# Patient Record
Sex: FEMALE | Race: WHITE | NOT HISPANIC OR LATINO | Employment: FULL TIME | URBAN - METROPOLITAN AREA
[De-identification: names, ages, dates, MRNs, and addresses within clinical notes are randomized per-mention and may not be internally consistent; named-entity substitution may affect disease eponyms.]

---
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
## 2018-02-08 ENCOUNTER — HOSPITAL ENCOUNTER (OUTPATIENT)
Dept: LAB | Facility: MEDICAL CENTER | Age: 18
End: 2018-02-08
Attending: INTERNAL MEDICINE
Payer: COMMERCIAL

## 2018-02-08 ENCOUNTER — OFFICE VISIT (OUTPATIENT)
Dept: MEDICAL GROUP | Facility: PHYSICIAN GROUP | Age: 18
End: 2018-02-08
Payer: COMMERCIAL

## 2018-02-08 VITALS
BODY MASS INDEX: 40.01 KG/M2 | RESPIRATION RATE: 14 BRPM | OXYGEN SATURATION: 98 % | SYSTOLIC BLOOD PRESSURE: 102 MMHG | WEIGHT: 264 LBS | HEIGHT: 68 IN | TEMPERATURE: 98.6 F | DIASTOLIC BLOOD PRESSURE: 64 MMHG | HEART RATE: 89 BPM

## 2018-02-08 DIAGNOSIS — G43.009 MIGRAINE WITHOUT AURA AND WITHOUT STATUS MIGRAINOSUS, NOT INTRACTABLE: ICD-10-CM

## 2018-02-08 DIAGNOSIS — E66.01 SEVERE OBESITY (BMI >= 40) (HCC): ICD-10-CM

## 2018-02-08 DIAGNOSIS — E28.2 PCOS (POLYCYSTIC OVARIAN SYNDROME): ICD-10-CM

## 2018-02-08 DIAGNOSIS — F43.23 ADJUSTMENT REACTION WITH ANXIETY AND DEPRESSION: ICD-10-CM

## 2018-02-08 DIAGNOSIS — Z82.49 FAMILY HISTORY OF EARLY CAD: ICD-10-CM

## 2018-02-08 DIAGNOSIS — J32.9 FREQUENT EPISODES OF SINUSITIS: ICD-10-CM

## 2018-02-08 DIAGNOSIS — J30.1 SEASONAL ALLERGIC RHINITIS DUE TO POLLEN, UNSPECIFIED CHRONICITY: ICD-10-CM

## 2018-02-08 DIAGNOSIS — J98.01 COLD INDUCED BRONCHOSPASM: ICD-10-CM

## 2018-02-08 LAB
25(OH)D3 SERPL-MCNC: 17 NG/ML (ref 30–100)
ALBUMIN SERPL BCP-MCNC: 4.5 G/DL (ref 3.2–4.9)
ALBUMIN/GLOB SERPL: 1.3 G/DL
ALP SERPL-CCNC: 88 U/L (ref 30–99)
ALT SERPL-CCNC: 16 U/L (ref 2–50)
ANION GAP SERPL CALC-SCNC: 8 MMOL/L (ref 0–11.9)
AST SERPL-CCNC: 16 U/L (ref 12–45)
BASOPHILS # BLD AUTO: 0.5 % (ref 0–1.8)
BASOPHILS # BLD: 0.04 K/UL (ref 0–0.12)
BILIRUB SERPL-MCNC: 0.5 MG/DL (ref 0.1–1.5)
BUN SERPL-MCNC: 11 MG/DL (ref 8–22)
CALCIUM SERPL-MCNC: 10.3 MG/DL (ref 8.5–10.5)
CHLORIDE SERPL-SCNC: 103 MMOL/L (ref 96–112)
CHOLEST SERPL-MCNC: 204 MG/DL (ref 100–199)
CO2 SERPL-SCNC: 26 MMOL/L (ref 20–33)
CREAT SERPL-MCNC: 0.71 MG/DL (ref 0.5–1.4)
EOSINOPHIL # BLD AUTO: 0.13 K/UL (ref 0–0.51)
EOSINOPHIL NFR BLD: 1.6 % (ref 0–6.9)
ERYTHROCYTE [DISTWIDTH] IN BLOOD BY AUTOMATED COUNT: 41.8 FL (ref 35.9–50)
EST. AVERAGE GLUCOSE BLD GHB EST-MCNC: 103 MG/DL
GLOBULIN SER CALC-MCNC: 3.5 G/DL (ref 1.9–3.5)
GLUCOSE SERPL-MCNC: 91 MG/DL (ref 65–99)
HBA1C MFR BLD: 5.2 % (ref 0–5.6)
HCT VFR BLD AUTO: 47.9 % (ref 37–47)
HDLC SERPL-MCNC: 48 MG/DL
HGB BLD-MCNC: 15.7 G/DL (ref 12–16)
IMM GRANULOCYTES # BLD AUTO: 0.02 K/UL (ref 0–0.11)
IMM GRANULOCYTES NFR BLD AUTO: 0.2 % (ref 0–0.9)
LDLC SERPL CALC-MCNC: 132 MG/DL
LYMPHOCYTES # BLD AUTO: 2.38 K/UL (ref 1–4.8)
LYMPHOCYTES NFR BLD: 28.5 % (ref 22–41)
MCH RBC QN AUTO: 28.3 PG (ref 27–33)
MCHC RBC AUTO-ENTMCNC: 32.8 G/DL (ref 33.6–35)
MCV RBC AUTO: 86.5 FL (ref 81.4–97.8)
MONOCYTES # BLD AUTO: 0.7 K/UL (ref 0–0.85)
MONOCYTES NFR BLD AUTO: 8.4 % (ref 0–13.4)
NEUTROPHILS # BLD AUTO: 5.09 K/UL (ref 2–7.15)
NEUTROPHILS NFR BLD: 60.8 % (ref 44–72)
NRBC # BLD AUTO: 0 K/UL
NRBC BLD-RTO: 0 /100 WBC
PLATELET # BLD AUTO: 314 K/UL (ref 164–446)
PMV BLD AUTO: 9.6 FL (ref 9–12.9)
POTASSIUM SERPL-SCNC: 4.1 MMOL/L (ref 3.6–5.5)
PROT SERPL-MCNC: 8 G/DL (ref 6–8.2)
RBC # BLD AUTO: 5.54 M/UL (ref 4.2–5.4)
SODIUM SERPL-SCNC: 137 MMOL/L (ref 135–145)
TRIGL SERPL-MCNC: 122 MG/DL (ref 0–149)
TSH SERPL DL<=0.005 MIU/L-ACNC: 3.86 UIU/ML (ref 0.38–5.33)
WBC # BLD AUTO: 8.4 K/UL (ref 4.8–10.8)

## 2018-02-08 PROCEDURE — 82306 VITAMIN D 25 HYDROXY: CPT

## 2018-02-08 PROCEDURE — 85025 COMPLETE CBC W/AUTO DIFF WBC: CPT

## 2018-02-08 PROCEDURE — 80053 COMPREHEN METABOLIC PANEL: CPT

## 2018-02-08 PROCEDURE — 83036 HEMOGLOBIN GLYCOSYLATED A1C: CPT

## 2018-02-08 PROCEDURE — 84443 ASSAY THYROID STIM HORMONE: CPT

## 2018-02-08 PROCEDURE — 99214 OFFICE O/P EST MOD 30 MIN: CPT | Performed by: INTERNAL MEDICINE

## 2018-02-08 PROCEDURE — 80061 LIPID PANEL: CPT

## 2018-02-08 PROCEDURE — 36415 COLL VENOUS BLD VENIPUNCTURE: CPT

## 2018-02-08 ASSESSMENT — PATIENT HEALTH QUESTIONNAIRE - PHQ9: CLINICAL INTERPRETATION OF PHQ2 SCORE: 0

## 2018-02-08 NOTE — ASSESSMENT & PLAN NOTE
She reports more anxiety than depression. She denies suicidal thoughts. She is currently looking for Job. No plan for college at this time. She likes to read books.

## 2018-02-08 NOTE — ASSESSMENT & PLAN NOTE
Due to PCOS, and also sometimes she is emotional eater. All her family is the same. Portion is another problem. She is not very active. She does not feel refresh in the morning. Mallampati score 3. She snores at night. She is not on meds for PCOS.

## 2018-02-08 NOTE — ASSESSMENT & PLAN NOTE
Very rare she uses albuterol prn, whenever she is sick. She denies shortness of breath, wheezing, cough.

## 2018-02-08 NOTE — ASSESSMENT & PLAN NOTE
She follows with GYN. She has sometimes cysts. Painful menstrual cycle. She was place on OCP but make the pain worse, so she stopped them. She has some facial hair, but not extensive. She does not want to take meds

## 2018-02-08 NOTE — PROGRESS NOTES
Subjective:   Jade Saunders is a 17 y.o. female here today for PCOS, weight loss, establish     Severe obesity (BMI >= 40) (CMS-HCC)  Due to PCOS, and also sometimes she is emotional eater. All her family is the same. Portion is another problem. She is not very active. She does not feel refresh in the morning. Mallampati score 3. She snores at night. She is not on meds for PCOS.     Seasonal allergic rhinitis due to pollen  Allergic to pines. She uses claritine prn, during spring season. No current flares     PCOS (polycystic ovarian syndrome)  She follows with GYN. She has sometimes cysts. Painful menstrual cycle. She was place on OCP but make the pain worse, so she stopped them. She has some facial hair, but not extensive. She does not want to take meds     Migraine without aura and without status migrainosus, not intractable  She gets migraine headaches rare, 1-2 times per month. She uses Excedrin and that relieves the headaches     Cold induced bronchospasm  Very rare she uses albuterol prn, whenever she is sick. She denies shortness of breath, wheezing, cough.    Adjustment reaction with anxiety and depression  She reports more anxiety than depression. She denies suicidal thoughts. She is currently looking for Job. No plan for college at this time. She likes to read books.        Current medicines (including changes today)  Current Outpatient Prescriptions   Medication Sig Dispense Refill   • albuterol 108 (90 BASE) MCG/ACT Aero Soln inhalation aerosol Inhale 2 Puffs by mouth every 6 hours as needed for Shortness of Breath. 8.5 g 1     No current facility-administered medications for this visit.      She  has a past medical history of Allergy; Anxiety; Depression; Migraine; and Polycystic ovarian syndrome.    Current Outpatient Prescriptions   Medication Sig Dispense Refill   • albuterol 108 (90 BASE) MCG/ACT Aero Soln inhalation aerosol Inhale 2 Puffs by mouth every 6 hours as needed for Shortness of  "Breath. 8.5 g 1     No current facility-administered medications for this visit.        Allergies as of 02/08/2018   • (No Known Allergies)       Social History     Social History   • Marital status: Single     Spouse name: N/A   • Number of children: N/A   • Years of education: N/A     Occupational History   • Not on file.     Social History Main Topics   • Smoking status: Never Smoker   • Smokeless tobacco: Never Used   • Alcohol use No   • Drug use: No   • Sexual activity: No     Other Topics Concern   • Behavioral Problems No   • Interpersonal Relationships No   • Sad Or Not Enjoying Activities No   • Suicidal Thoughts No   • Poor School Performance No   • Reading Difficulties No   • Speech Difficulties No   • Writing Difficulties No   • Inadequate Sleep Yes   • Excessive Tv Viewing No   • Excessive Video Game Use No   • Inadequate Exercise Yes   • Sports Related No   • Poor Diet No   • Family Concerns For Drug/Alcohol Abuse No   • Poor Oral Hygiene No   • Bike Safety No   • Family Concerns Vehicle Safety No     Social History Narrative   • No narrative on file        Family History   Problem Relation Age of Onset   • Heart Disease Father    • Hyperlipidemia Father    • Cancer Maternal Grandfather      skin cancer, possible melanoma       History reviewed. No pertinent surgical history.    ROS   No chest pain, no shortness of breath, no abdominal pain       Objective:     Blood pressure 102/64, pulse 89, temperature 37 °C (98.6 °F), resp. rate 14, height 1.727 m (5' 7.99\"), weight 119.7 kg (264 lb), last menstrual period 11/01/2017, SpO2 98 %, not currently breastfeeding. Body mass index is 40.15 kg/m².   Physical Exam:  Constitutional: Alert, no distress.  Skin: Warm, dry, good turgor, no rashes in visible areas. Significant acne   Eye: Equal, round and reactive, conjunctiva clear, lids normal.  ENMT: Lips without lesions, good dentition, oropharynx clear.  Neck: Trachea midline, no masses, no thyromegaly. No " cervical or supraclavicular lymphadenopathy  Respiratory: Unlabored respiratory effort, lungs clear to auscultation, no wheezes, no ronchi.  Cardiovascular: Normal S1, S2, no murmur, no edema.  Abdomen: Soft, non-tender, no masses, no hepatosplenomegaly.  Psych: Alert and oriented x3, normal affect and mood.        Assessment and Plan:   The following treatment plan was discussed    1. Severe obesity (BMI >= 40) (CMS-[facility])  Counseling for a small portion, balanced diet, exercising 3-5 times per week.  Rule out hypothyroidism, diabetes.   She is interested for weight loss program and nutrition counseling. Referral in place.  - HEMOGLOBIN A1C; Future  - COMP METABOLIC PANEL; Future  - CBC WITH DIFFERENTIAL; Future  - TSH WITH REFLEX TO FT4; Future  - LIPID PROFILE; Future  - VITAMIN D,25 HYDROXY; Future  - REFERRAL TO [facility] [facility]PROV[facility]GR[facility] (HIP) Services Requested: [facility] Weight Management Program; Reason for Referral? BMI>30; Reason for Visit: Overweight/Obesity    2. Seasonal allergic rhinitis due to pollen, unspecified chronicity  Saline nasal spray rinsing, flonase prn     3. Migraine without aura and without status migrainosus, not intractable  exedrine prn     4. Frequent episodes of sinusitis  Resolved., last one one year ago     5. Adjustment reaction with anxiety and depression  No need for medication. I advised pt for cousneling, cognitive behavioral therapy     6. Cold induced bronchospasm  Stable, no flares. Albuterol prn     7. Family history of early CAD  Weight loss. Lab work to follow     8. PCOS (polycystic ovarian syndrome)  Follow up with GYN   - HEMOGLOBIN A1C; Future  - COMP METABOLIC PANEL; Future      Followup: Return in about 1 year (around 2/8/2019), or if symptoms worsen or fail to improve, for Short, annual exam.

## 2018-02-08 NOTE — ASSESSMENT & PLAN NOTE
She gets migraine headaches rare, 1-2 times per month. She uses Excedrin and that relieves the headaches

## 2018-02-12 ENCOUNTER — TELEPHONE (OUTPATIENT)
Dept: MEDICAL GROUP | Facility: PHYSICIAN GROUP | Age: 18
End: 2018-02-12

## 2018-02-12 NOTE — TELEPHONE ENCOUNTER
----- Message from Cornelius Isidro M.D. sent at 2/11/2018 10:34 PM PST -----  Please notify patient that blood sugar, kidney function, liver function, blood count  are normal  Vitamin D is low. Please advise her to start taking vitamin D over the encounter 1000 U daily.   Also cholesterol is slight elevated, Please advise her low fat diet, fruits and vegetables, exercise 3-5 times per week.   Let me know if she has questions   Thank you    Cornelius Isidro M.D.

## 2018-02-12 NOTE — TELEPHONE ENCOUNTER
Phone Number Called: [phone] (home)     Message: LVM to call back.     Left Message for patient to call back: yes

## 2018-04-11 ENCOUNTER — HOSPITAL ENCOUNTER (OUTPATIENT)
Facility: MEDICAL CENTER | Age: 18
End: 2018-04-11
Attending: INTERNAL MEDICINE
Payer: COMMERCIAL

## 2018-04-11 ENCOUNTER — OFFICE VISIT (OUTPATIENT)
Dept: MEDICAL GROUP | Facility: PHYSICIAN GROUP | Age: 18
End: 2018-04-11
Payer: COMMERCIAL

## 2018-04-11 VITALS
HEART RATE: 90 BPM | TEMPERATURE: 98.6 F | SYSTOLIC BLOOD PRESSURE: 116 MMHG | OXYGEN SATURATION: 100 % | HEIGHT: 68 IN | WEIGHT: 272 LBS | BODY MASS INDEX: 41.22 KG/M2 | RESPIRATION RATE: 14 BRPM | DIASTOLIC BLOOD PRESSURE: 70 MMHG

## 2018-04-11 DIAGNOSIS — B37.31 YEAST VAGINITIS: ICD-10-CM

## 2018-04-11 DIAGNOSIS — L73.9 FOLLICULITIS: ICD-10-CM

## 2018-04-11 PROCEDURE — 87510 GARDNER VAG DNA DIR PROBE: CPT

## 2018-04-11 PROCEDURE — 99000 SPECIMEN HANDLING OFFICE-LAB: CPT | Performed by: INTERNAL MEDICINE

## 2018-04-11 PROCEDURE — 87660 TRICHOMONAS VAGIN DIR PROBE: CPT

## 2018-04-11 PROCEDURE — 87480 CANDIDA DNA DIR PROBE: CPT

## 2018-04-11 PROCEDURE — 99214 OFFICE O/P EST MOD 30 MIN: CPT | Performed by: INTERNAL MEDICINE

## 2018-04-11 RX ORDER — FLUCONAZOLE 150 MG/1
150 TABLET ORAL
Qty: 2 TAB | Refills: 0 | Status: SHIPPED | OUTPATIENT
Start: 2018-04-11 | End: 2019-04-24

## 2018-04-11 NOTE — PROGRESS NOTES
Subjective:   Jade Saunders is a 17 y.o. female here today for cyst around thig, vaginal discharges     18 Y/o F past medical history of obesity, migraine, adjustment anxiety presented complain for cyst on upper thigh, around genital are, sometimes irritated, worse after exercising. She is using warm compression and helps. No fever.   Also pt reports white vaginal discharge for couple of weeks, some itchiness, never been sexually active. No fever. She denies dysuria.        Current medicines (including changes today)  Current Outpatient Prescriptions   Medication Sig Dispense Refill   • fluconazole (DIFLUCAN) 150 MG tablet Take 1 Tab by mouth every 72 hours. 2 Tab 0   • albuterol 108 (90 BASE) MCG/ACT Aero Soln inhalation aerosol Inhale 2 Puffs by mouth every 6 hours as needed for Shortness of Breath. 8.5 g 1     No current facility-administered medications for this visit.      She  has a past medical history of Allergy; Anxiety; Depression; Migraine; and Polycystic ovarian syndrome.    Current Outpatient Prescriptions   Medication Sig Dispense Refill   • fluconazole (DIFLUCAN) 150 MG tablet Take 1 Tab by mouth every 72 hours. 2 Tab 0   • albuterol 108 (90 BASE) MCG/ACT Aero Soln inhalation aerosol Inhale 2 Puffs by mouth every 6 hours as needed for Shortness of Breath. 8.5 g 1     No current facility-administered medications for this visit.        Allergies as of 04/11/2018   • (No Known Allergies)       Social History     Social History   • Marital status: Single     Spouse name: N/A   • Number of children: N/A   • Years of education: N/A     Occupational History   • Not on file.     Social History Main Topics   • Smoking status: Never Smoker   • Smokeless tobacco: Never Used   • Alcohol use No   • Drug use: No   • Sexual activity: No     Other Topics Concern   • Behavioral Problems No   • Interpersonal Relationships No   • Sad Or Not Enjoying Activities No   • Suicidal Thoughts No   • Poor School Performance  "No   • Reading Difficulties No   • Speech Difficulties No   • Writing Difficulties No   • Inadequate Sleep Yes   • Excessive Tv Viewing No   • Excessive Video Game Use No   • Inadequate Exercise Yes   • Sports Related No   • Poor Diet No   • Family Concerns For Drug/Alcohol Abuse No   • Poor Oral Hygiene No   • Bike Safety No   • Family Concerns Vehicle Safety No     Social History Narrative   • No narrative on file        Family History   Problem Relation Age of Onset   • Heart Disease Father    • Hyperlipidemia Father    • Cancer Maternal Grandfather      skin cancer, possible melanoma       No past surgical history on file.    ROS   No chest pain, no shortness of breath, no abdominal pain, see HPI        Objective:     Pulse 90, temperature 37 °C (98.6 °F), height 1.727 m (5' 7.99\"), weight 123.4 kg (272 lb), last menstrual period 03/01/2018, SpO2 100 %, not currently breastfeeding. Body mass index is 41.37 kg/m².   Physical Exam:  Constitutional: Alert, no distress.  Skin: Warm, dry, she has acne, folliculitis on upper thigh. No carbuncle, no ingrowing hair   Eye: Equal, round and reactive, conjunctiva clear, lids normal.  ENMT: Lips without lesions, good dentition,  Respiratory: Unlabored respiratory effort,  Abdomen: Soft, non-tender, no masses, no hepatosplenomegaly.  Psych: Alert and oriented x3, normal affect and mood.  : skin as per above. No genital rash. Pt is very stressed. Cannot allow speculum visualization, I tried vaginal pathogen swab. No lower pelvic tenderness.       Assessment and Plan:   The following treatment plan was discussed    1. Yeast vaginitis  I will treat empiric for yeast vaginitis. I cannot rule out bacterial vaginosis. She has never been sexually active. No risk for STD. No external lesions. Culture to follow     2. Folliculitis  I advise to continue well hygiene. I advise to use neosporin cream after shower, and baby powder when she goes for exercise       Followup: Return if " symptoms worsen or fail to improve, for Short.

## 2018-04-12 DIAGNOSIS — B37.31 YEAST VAGINITIS: ICD-10-CM

## 2018-04-12 LAB
CANDIDA DNA VAG QL PROBE+SIG AMP: NEGATIVE
G VAGINALIS DNA VAG QL PROBE+SIG AMP: NEGATIVE
T VAGINALIS DNA VAG QL PROBE+SIG AMP: NEGATIVE

## 2018-08-21 ENCOUNTER — OFFICE VISIT (OUTPATIENT)
Dept: URGENT CARE | Facility: CLINIC | Age: 18
End: 2018-08-21
Payer: COMMERCIAL

## 2018-08-21 ENCOUNTER — APPOINTMENT (OUTPATIENT)
Dept: RADIOLOGY | Facility: IMAGING CENTER | Age: 18
End: 2018-08-21
Attending: NURSE PRACTITIONER
Payer: COMMERCIAL

## 2018-08-21 VITALS
HEIGHT: 68 IN | BODY MASS INDEX: 43.1 KG/M2 | SYSTOLIC BLOOD PRESSURE: 100 MMHG | RESPIRATION RATE: 16 BRPM | TEMPERATURE: 98.2 F | OXYGEN SATURATION: 97 % | HEART RATE: 97 BPM | WEIGHT: 284.4 LBS | DIASTOLIC BLOOD PRESSURE: 80 MMHG

## 2018-08-21 DIAGNOSIS — R07.81 RIB PAIN ON RIGHT SIDE: ICD-10-CM

## 2018-08-21 DIAGNOSIS — E66.01 MORBID OBESITY WITH BMI OF 40.0-44.9, ADULT (HCC): ICD-10-CM

## 2018-08-21 DIAGNOSIS — S29.011A CHEST WALL MUSCLE STRAIN, INITIAL ENCOUNTER: ICD-10-CM

## 2018-08-21 PROCEDURE — 71101 X-RAY EXAM UNILAT RIBS/CHEST: CPT | Mod: TC,RT | Performed by: NURSE PRACTITIONER

## 2018-08-21 PROCEDURE — 99214 OFFICE O/P EST MOD 30 MIN: CPT | Performed by: NURSE PRACTITIONER

## 2018-08-21 RX ORDER — CYCLOBENZAPRINE HCL 10 MG
10 TABLET ORAL 3 TIMES DAILY PRN
Qty: 20 TAB | Refills: 0 | Status: SHIPPED | OUTPATIENT
Start: 2018-08-21 | End: 2019-04-24

## 2018-08-21 NOTE — PROGRESS NOTES
Subjective:      Jade Saunders is a 18 y.o. female who presents with Rib Pain (RT side started yesterday)            This is a new problem. Pt reports she was lying in bed yesterday when she sneezed very abruptly and forcefully. She reports pain to her right lower rib cage immediately after this happened. She states she cannot take a deep breath, change positions or cough without pain. She admits she took advil last night and used a warm compress and could barely get comfortable enough to fall asleep. When she woke this morning, the pain was still present as bad as it was yesterday. She can reproduce the pain with pushing on the area. She denies any SOB or trouble breathing.        Review of Systems   Musculoskeletal:        Chest wall pain   All other systems reviewed and are negative.    Past Medical History:   Diagnosis Date   • Allergy    • Anxiety     seeing a psychologist   • Depression     seeing psychologist   • Migraine     once a week - excedrin   • Polycystic ovarian syndrome     diagnosed 2012    No past surgical history on file.   Social History     Social History   • Marital status: Single     Spouse name: N/A   • Number of children: N/A   • Years of education: N/A     Occupational History   • Not on file.     Social History Main Topics   • Smoking status: Never Smoker   • Smokeless tobacco: Never Used   • Alcohol use No   • Drug use: No   • Sexual activity: No     Other Topics Concern   • Behavioral Problems No   • Interpersonal Relationships No   • Sad Or Not Enjoying Activities No   • Suicidal Thoughts No   • Poor School Performance No   • Reading Difficulties No   • Speech Difficulties No   • Writing Difficulties No   • Inadequate Sleep Yes   • Excessive Tv Viewing No   • Excessive Video Game Use No   • Inadequate Exercise Yes   • Sports Related No   • Poor Diet No   • Family Concerns For Drug/Alcohol Abuse No   • Poor Oral Hygiene No   • Bike Safety No   • Family Concerns Vehicle Safety No      "    Social History Narrative   • No narrative on file          Objective:     /80   Pulse 97   Temp 36.8 °C (98.2 °F)   Resp 16   Ht 1.727 m (5' 8\")   Wt (!) 129 kg (284 lb 6.4 oz)   SpO2 97%   BMI 43.24 kg/m²      Physical Exam   Constitutional: She is oriented to person, place, and time. Vital signs are normal. She appears well-developed and well-nourished.   HENT:   Head: Normocephalic and atraumatic.   Eyes: Pupils are equal, round, and reactive to light. EOM are normal.   Neck: Normal range of motion.   Cardiovascular: Normal rate and regular rhythm.    Pulmonary/Chest: Effort normal and breath sounds normal. She exhibits tenderness.       Musculoskeletal: Normal range of motion.   Neurological: She is alert and oriented to person, place, and time.   Skin: Skin is warm and dry. Capillary refill takes less than 2 seconds.   Psychiatric: She has a normal mood and affect. Her speech is normal and behavior is normal. Thought content normal.   Vitals reviewed.         Xray: no fracture or dislocation by my read. Radiology review pending.       8/21/2018 8:49 AM    HISTORY/REASON FOR EXAM:  Patient sneezed yesterday and felt a pop rib cage area under her right breast.    TECHNIQUE/EXAM DESCRIPTION AND NUMBER OF VIEWS:  5 images of the right ribs and chest.    COMPARISON: Chest x-ray 5/8/2017    FINDINGS:  Cardiomediastinal contour is within normal limits.  No focal pulmonary consolidation.  No pleural fluid collection or pneumothorax.  No displaced RIGHT rib fracture.  No major bony abnormality is seen.   Impression       1.  No acute cardiopulmonary disease.  2.  No acute RIGHT rib findings.       Assessment/Plan:       1. Rib pain on right side  - [id number] (WITH 1-VIEW CXR) RIGHT; Future    2. Chest wall muscle strain, initial encounter  - cyclobenzaprine (FLEXERIL) 10 MG Tab; Take 1 Tab by mouth 3 times a day as needed for Muscle Spasms (take at night for bed).  Dispense: 20 Tab; Refill: " 0    3. Morbid obesity with BMI of 40.0-44.9, adult (HCC)  - Patient identified as having weight management issue.  Appropriate orders and counseling given.    Discussed xray findings with patient and reassured her this appears to be an MSK strain. She may have sharp pain to the area for several weeks  Alternate tylenol and ibuprofen regularly for the next few days  Sedating effects of flexeril discussed, take at night  Warm compresses as often as needed to help with pain  Demonstrated splinting techniques  Supportive care, differential diagnoses, and indications for immediate follow-up discussed with patient.    Pathogenesis of diagnosis discussed including typical length and natural progression.      Instructed to return to  or nearest emergency department if symptoms fail to improve, for any change in condition, further concerns, or new concerning symptoms.  Patient states understanding of the plan of care and discharge instructions.

## 2018-08-22 ENCOUNTER — APPOINTMENT (OUTPATIENT)
Dept: MEDICAL GROUP | Facility: PHYSICIAN GROUP | Age: 18
End: 2018-08-22
Payer: COMMERCIAL

## 2018-12-30 ENCOUNTER — OFFICE VISIT (OUTPATIENT)
Dept: URGENT CARE | Facility: CLINIC | Age: 18
End: 2018-12-30
Payer: COMMERCIAL

## 2018-12-30 DIAGNOSIS — J32.9 OTHER SINUSITIS, UNSPECIFIED CHRONICITY: ICD-10-CM

## 2018-12-30 DIAGNOSIS — J40 BRONCHITIS: ICD-10-CM

## 2018-12-30 DIAGNOSIS — J45.20 RAD (REACTIVE AIRWAY DISEASE), MILD INTERMITTENT, UNCOMPLICATED: ICD-10-CM

## 2018-12-30 PROCEDURE — 99214 OFFICE O/P EST MOD 30 MIN: CPT | Performed by: PHYSICIAN ASSISTANT

## 2018-12-30 RX ORDER — AZITHROMYCIN 500 MG/1
500 TABLET, FILM COATED ORAL DAILY
Qty: 10 TAB | Refills: 0 | Status: SHIPPED | OUTPATIENT
Start: 2018-12-30 | End: 2019-01-09

## 2018-12-30 RX ORDER — PROMETHAZINE HYDROCHLORIDE AND CODEINE PHOSPHATE 6.25; 1 MG/5ML; MG/5ML
5-10 SYRUP ORAL 3 TIMES DAILY PRN
Qty: 150 ML | Refills: 0 | Status: SHIPPED | OUTPATIENT
Start: 2018-12-30 | End: 2019-01-04

## 2018-12-30 RX ORDER — ALBUTEROL SULFATE 90 UG/1
2 AEROSOL, METERED RESPIRATORY (INHALATION) EVERY 4 HOURS PRN
Qty: 1 INHALER | Refills: 0 | Status: SHIPPED | OUTPATIENT
Start: 2018-12-30 | End: 2019-04-24

## 2018-12-30 RX ORDER — METHYLPREDNISOLONE 4 MG/1
TABLET ORAL
Qty: 1 TAB | Refills: 0 | Status: SHIPPED | OUTPATIENT
Start: 2018-12-30 | End: 2019-04-24

## 2018-12-30 ASSESSMENT — ENCOUNTER SYMPTOMS
EYES NEGATIVE: 1
RHINORRHEA: 1
CONSTITUTIONAL NEGATIVE: 1
CARDIOVASCULAR NEGATIVE: 1
FEVER: 0
WHEEZING: 1
SORE THROAT: 1
COUGH: 1
SHORTNESS OF BREATH: 1

## 2018-12-30 NOTE — PROGRESS NOTES
Subjective:      Jade Saunders is a 18 y.o. female who presents with No chief complaint on file.            Cough   This is a new problem. The current episode started in the past 7 days. The problem has been unchanged. The problem occurs every few minutes. The cough is productive of sputum. Associated symptoms include nasal congestion, rhinorrhea, a sore throat, shortness of breath and wheezing. Pertinent negatives include no fever. Nothing aggravates the symptoms. She has tried nothing for the symptoms. The treatment provided no relief. There is no history of asthma.       Review of Systems   Constitutional: Negative.  Negative for fever.   HENT: Positive for rhinorrhea and sore throat.    Eyes: Negative.    Respiratory: Positive for cough, shortness of breath and wheezing.    Cardiovascular: Negative.    Skin: Negative.           Objective:     There were no vitals taken for this visit.     Physical Exam   Constitutional: She is oriented to person, place, and time. She appears well-developed and well-nourished. No distress.   HENT:   Head: Normocephalic and atraumatic.   Mouth/Throat: Oropharynx is clear and moist.   Eyes: Pupils are equal, round, and reactive to light. Conjunctivae and EOM are normal.   Neck: Normal range of motion. Neck supple.   Cardiovascular: Normal rate, regular rhythm and normal heart sounds.    Pulmonary/Chest: Effort normal. No respiratory distress. She has wheezes (?Whz/crackle L>R side). She has no rales.   Lymphadenopathy:     She has no cervical adenopathy.   Neurological: She is alert and oriented to person, place, and time.   Skin: Skin is warm and dry.   Psychiatric: She has a normal mood and affect. Her behavior is normal.   Nursing note and vitals reviewed.    Active Ambulatory Problems     Diagnosis Date Noted   • Migraine without aura and without status migrainosus, not intractable 02/08/2017   • Cold induced bronchospasm 02/08/2017   • Family history of early CAD  02/08/2017   • Adjustment reaction with anxiety and depression 02/08/2017   • Severe obesity (BMI >= 40) ([facility]) 02/08/2017   • Seasonal allergic rhinitis due to pollen 02/08/2017   • PCOS (polycystic ovarian syndrome) 02/08/2018   • Yeast vaginitis 04/11/2018   • Folliculitis 04/11/2018   • Morbid obesity with BMI of 40.0-44.9, adult ([facility]) 08/21/2018     Resolved Ambulatory Problems     Diagnosis Date Noted   • Acute pharyngitis 09/16/2016   • Acute lymphadenitis 09/16/2016   • Frequent episodes of sinusitis 02/08/2017     Past Medical History:   Diagnosis Date   • Allergy    • Anxiety    • Depression    • Migraine    • Polycystic ovarian syndrome      Current Outpatient Prescriptions on File Prior to Visit   Medication Sig Dispense Refill   • cyclobenzaprine (FLEXERIL) 10 MG Tab Take 1 Tab by mouth 3 times a day as needed for Muscle Spasms (take at night for bed). 20 Tab 0   • fluconazole (DIFLUCAN) 150 MG tablet Take 1 Tab by mouth every 72 hours. 2 Tab 0   • albuterol 108 (90 BASE) MCG/ACT Aero Soln inhalation aerosol Inhale 2 Puffs by mouth every 6 hours as needed for Shortness of Breath. 8.5 g 1     No current facility-administered medications on file prior to visit.      Social History     Social History   • Marital status: Single     Spouse name: N/A   • Number of children: N/A   • Years of education: N/A     Occupational History   • Not on file.     Social History Main Topics   • Smoking status: Never Smoker   • Smokeless tobacco: Never Used   • Alcohol use No   • Drug use: No   • Sexual activity: No     Other Topics Concern   • Behavioral Problems No   • Interpersonal Relationships No   • Sad Or Not Enjoying Activities No   • Suicidal Thoughts No   • Poor School Performance No   • Reading Difficulties No   • Speech Difficulties No   • Writing Difficulties No   • Inadequate Sleep Yes   • Excessive Tv Viewing No   • Excessive Video Game Use No   • Inadequate Exercise Yes   • Sports Related No   • Poor Diet No    • Family Concerns For Drug/Alcohol Abuse No   • Poor Oral Hygiene No   • Bike Safety No   • Family Concerns Vehicle Safety No     Social History Narrative   • No narrative on file     Family History   Problem Relation Age of Onset   • Heart Disease Father    • Hyperlipidemia Father    • Cancer Maternal Grandfather         skin cancer, possible melanoma     Patient has no known allergies.              Assessment/Plan:     · Acute  bronchitis      · rx abx [will start on stronger med init.dosing to cover for acute cough vs early pneumonia]

## 2019-04-24 ENCOUNTER — OFFICE VISIT (OUTPATIENT)
Dept: MEDICAL GROUP | Facility: PHYSICIAN GROUP | Age: 19
End: 2019-04-24
Payer: COMMERCIAL

## 2019-04-24 ENCOUNTER — HOSPITAL ENCOUNTER (OUTPATIENT)
Dept: LAB | Facility: MEDICAL CENTER | Age: 19
End: 2019-04-24
Attending: NURSE PRACTITIONER
Payer: COMMERCIAL

## 2019-04-24 VITALS
WEIGHT: 279 LBS | DIASTOLIC BLOOD PRESSURE: 84 MMHG | HEIGHT: 68 IN | SYSTOLIC BLOOD PRESSURE: 116 MMHG | RESPIRATION RATE: 16 BRPM | OXYGEN SATURATION: 97 % | HEART RATE: 89 BPM | TEMPERATURE: 97.9 F | BODY MASS INDEX: 42.28 KG/M2

## 2019-04-24 DIAGNOSIS — R06.83 SNORING: ICD-10-CM

## 2019-04-24 DIAGNOSIS — E66.01 SEVERE OBESITY (BMI >= 40) (HCC): ICD-10-CM

## 2019-04-24 DIAGNOSIS — H66.001 ACUTE SUPPURATIVE OTITIS MEDIA OF RIGHT EAR WITHOUT SPONTANEOUS RUPTURE OF TYMPANIC MEMBRANE, RECURRENCE NOT SPECIFIED: ICD-10-CM

## 2019-04-24 DIAGNOSIS — F43.23 ADJUSTMENT REACTION WITH ANXIETY AND DEPRESSION: ICD-10-CM

## 2019-04-24 DIAGNOSIS — H61.21 CERUMEN DEBRIS ON TYMPANIC MEMBRANE OF RIGHT EAR: ICD-10-CM

## 2019-04-24 DIAGNOSIS — G47.19 EXCESSIVE DAYTIME SLEEPINESS: ICD-10-CM

## 2019-04-24 DIAGNOSIS — F45.41 STRESS HEADACHES: ICD-10-CM

## 2019-04-24 DIAGNOSIS — J45.909 ASTHMA DUE TO ENVIRONMENTAL ALLERGIES: ICD-10-CM

## 2019-04-24 DIAGNOSIS — F43.21 GRIEF REACTION: ICD-10-CM

## 2019-04-24 DIAGNOSIS — G43.009 MIGRAINE WITHOUT AURA AND WITHOUT STATUS MIGRAINOSUS, NOT INTRACTABLE: ICD-10-CM

## 2019-04-24 DIAGNOSIS — E28.2 PCOS (POLYCYSTIC OVARIAN SYNDROME): ICD-10-CM

## 2019-04-24 DIAGNOSIS — Z00.00 GENERAL MEDICAL EXAM: ICD-10-CM

## 2019-04-24 DIAGNOSIS — F45.8 BRUXISM (TEETH GRINDING): ICD-10-CM

## 2019-04-24 PROBLEM — F43.20 GRIEF REACTION: Status: ACTIVE | Noted: 2019-04-24

## 2019-04-24 LAB
ALBUMIN SERPL BCP-MCNC: 4.4 G/DL (ref 3.2–4.9)
ALBUMIN/GLOB SERPL: 1.3 G/DL
ALP SERPL-CCNC: 96 U/L (ref 30–99)
ALT SERPL-CCNC: 41 U/L (ref 2–50)
ANION GAP SERPL CALC-SCNC: 8 MMOL/L (ref 0–11.9)
AST SERPL-CCNC: 32 U/L (ref 12–45)
BASOPHILS # BLD AUTO: 0.5 % (ref 0–1.8)
BASOPHILS # BLD: 0.05 K/UL (ref 0–0.12)
BILIRUB SERPL-MCNC: 0.6 MG/DL (ref 0.1–1.5)
BUN SERPL-MCNC: 14 MG/DL (ref 8–22)
CALCIUM SERPL-MCNC: 9.6 MG/DL (ref 8.5–10.5)
CHLORIDE SERPL-SCNC: 104 MMOL/L (ref 96–112)
CHOLEST SERPL-MCNC: 206 MG/DL (ref 100–199)
CO2 SERPL-SCNC: 24 MMOL/L (ref 20–33)
CREAT SERPL-MCNC: 0.63 MG/DL (ref 0.5–1.4)
EOSINOPHIL # BLD AUTO: 0.16 K/UL (ref 0–0.51)
EOSINOPHIL NFR BLD: 1.6 % (ref 0–6.9)
ERYTHROCYTE [DISTWIDTH] IN BLOOD BY AUTOMATED COUNT: 43.9 FL (ref 35.9–50)
FASTING STATUS PATIENT QL REPORTED: NORMAL
GLOBULIN SER CALC-MCNC: 3.3 G/DL (ref 1.9–3.5)
GLUCOSE SERPL-MCNC: 87 MG/DL (ref 65–99)
HCT VFR BLD AUTO: 46.9 % (ref 37–47)
HDLC SERPL-MCNC: 55 MG/DL
HGB BLD-MCNC: 15.2 G/DL (ref 12–16)
IMM GRANULOCYTES # BLD AUTO: 0.04 K/UL (ref 0–0.11)
IMM GRANULOCYTES NFR BLD AUTO: 0.4 % (ref 0–0.9)
LDLC SERPL CALC-MCNC: 130 MG/DL
LYMPHOCYTES # BLD AUTO: 2.64 K/UL (ref 1–4.8)
LYMPHOCYTES NFR BLD: 26.3 % (ref 22–41)
MCH RBC QN AUTO: 28.3 PG (ref 27–33)
MCHC RBC AUTO-ENTMCNC: 32.4 G/DL (ref 33.6–35)
MCV RBC AUTO: 87.3 FL (ref 81.4–97.8)
MONOCYTES # BLD AUTO: 0.83 K/UL (ref 0–0.85)
MONOCYTES NFR BLD AUTO: 8.3 % (ref 0–13.4)
NEUTROPHILS # BLD AUTO: 6.32 K/UL (ref 2–7.15)
NEUTROPHILS NFR BLD: 62.9 % (ref 44–72)
NRBC # BLD AUTO: 0 K/UL
NRBC BLD-RTO: 0 /100 WBC
PLATELET # BLD AUTO: 339 K/UL (ref 164–446)
PMV BLD AUTO: 9.5 FL (ref 9–12.9)
POTASSIUM SERPL-SCNC: 3.8 MMOL/L (ref 3.6–5.5)
PROT SERPL-MCNC: 7.7 G/DL (ref 6–8.2)
RBC # BLD AUTO: 5.37 M/UL (ref 4.2–5.4)
SODIUM SERPL-SCNC: 136 MMOL/L (ref 135–145)
TRIGL SERPL-MCNC: 107 MG/DL (ref 0–149)
WBC # BLD AUTO: 10 K/UL (ref 4.8–10.8)

## 2019-04-24 PROCEDURE — 80061 LIPID PANEL: CPT

## 2019-04-24 PROCEDURE — 36415 COLL VENOUS BLD VENIPUNCTURE: CPT

## 2019-04-24 PROCEDURE — 80053 COMPREHEN METABOLIC PANEL: CPT

## 2019-04-24 PROCEDURE — 85025 COMPLETE CBC W/AUTO DIFF WBC: CPT

## 2019-04-24 PROCEDURE — 99214 OFFICE O/P EST MOD 30 MIN: CPT | Performed by: NURSE PRACTITIONER

## 2019-04-24 RX ORDER — AMOXICILLIN 500 MG/1
500 CAPSULE ORAL 3 TIMES DAILY
Qty: 30 CAP | Refills: 0 | Status: SHIPPED | OUTPATIENT
Start: 2019-04-24 | End: 2022-04-24

## 2019-04-24 RX ORDER — CYCLOBENZAPRINE HCL 5 MG
5-10 TABLET ORAL 3 TIMES DAILY PRN
Qty: 30 TAB | Refills: 0 | Status: SHIPPED | OUTPATIENT
Start: 2019-04-24 | End: 2022-04-24

## 2019-04-24 ASSESSMENT — PATIENT HEALTH QUESTIONNAIRE - PHQ9: CLINICAL INTERPRETATION OF PHQ2 SCORE: 0

## 2019-04-24 NOTE — PROGRESS NOTES
Chief Complaint   Patient presents with   • Snoring     check for sleep apnea; not sleeping as well    • Sinus Problem     allergies    • Headache     back of neck x 1 month        HISTORY OF THE PRESENT ILLNESS: This is a 18 y.o. female established patient with the clinic, normally seen by Dr. Isidro who presents today for follow up.    Snoring  Excessive daytime sleepiness  Patient is inquiring about sleep studies. She reports a family history of sleep apnea in her father, and snores nightly herself. Patient reports she wakes up in the middle of the night suddenly for no particular reason and experienced day time sleepiness.    Cerumen debris on tympanic membrane of right ear  Acute suppurative otitis media of right ear without spontaneous rupture of tympanic membrane, recurrence not specified  Patient is also reporting persistent congestion(sinus and ear) believed to be secondary to her seasonal allergies. She is currently experiencing ear pain, with a recurrent history of otitis media due to the congestion. She does a specific regimen of 3 weeks of Zyrtec, then 3 weeks of Claritin, then 3 weeks of Allegra, which seems to manage her symptoms. She will also use Mucinex if she does not work that day. No complaints of fevers, chills, sore throat.    Asthma due to environmental allergies  Patient has a history of viral induced asthma, managed well with a rescue albuterol inhaler.    PCOS (polycystic ovarian syndrome)  In regards to her PCOS, patient has tried to manage this with birth control in the past, however, she developed worsening abdominal pain secondary to more frequent cysts, and worsening of her mood swings, so she is not on birth control currently. Patient reports that her periods are more regulated recently since she increased her exercise and began watching her diet more closely taking out sugar and salt.    Adjustment reaction with anxiety and depression  Patient has a history of anxiety and depression.  She used to follow with psychiatry for these conditions, however, since exiting high school and beginning to work, the depression and anxiety are better managed without medications.     Migraine without aura and without status migrainosus, not intractable  Stress headaches  Bruxism (teeth grinding)  Grief reaction  Patient also has a history of migraines without auras, normally triggered by stress or atmosphere changes. Normally these are located across her temples, managed with OTC Excedrin. Migraines started in highschool, and recently she has only been experiencing 3 migraines a month. Over the last few weeks patient has been experiencing a different type of headache, that now begins at the base of her neck and extends up into the back of her head. This is not managed well with Excedrin as her past headaches have been and Advil only improved the headache a small amount. Patient believes this could be secondary to stress, as her dog passed away just before these new headaches began, and she began to grind her teeth more frequently as well.  At this time, she reports she wakes up every morning with a stiff neck and this headache, that gradually resolves throughout the day.    Past Medical History:   Diagnosis Date   • Allergy    • Anxiety     seeing a psychologist   • Depression     seeing psychologist   • Migraine     once a week - Excedrin   • Polycystic ovarian syndrome     diagnosed 2012       Past Surgical History:   Procedure Laterality Date   • DENTAL EXTRACTION(S)         Family Status   Relation Status   • Fa Alive   • Mo Alive   • Sis Alive   • Bro Alive   • PGMo Alive   • PGFa Alive   • MGFa (Not Specified)     Family History   Problem Relation Age of Onset   • Heart Disease Father 43        MI   • Hyperlipidemia Father    • Cancer Paternal Grandmother         possible melanoma       Social History   Substance Use Topics   • Smoking status: Never Smoker   • Smokeless tobacco: Never Used   • Alcohol use  "No       Allergies: Patient has no known allergies.    Current Outpatient Prescriptions Ordered in [facility]   Medication Sig Dispense Refill   • cyclobenzaprine (FLEXERIL) 5 MG tablet Take 1-2 Tabs by mouth 3 times a day as needed for Muscle Spasms. 30 Tab 0   • albuterol 108 (90 BASE) MCG/ACT Aero Soln inhalation aerosol Inhale 2 Puffs by mouth every 6 hours as needed for Shortness of Breath. 8.5 g 1     No current [facility]-ordered facility-administered medications on file.        Review of Systems   Constitutional: day time sleepyness Negative for fever, chills, weight loss  HENT: positive snoring, teeth grinding, sinus and ear congestion, ear pain Negative for nosebleeds, sore throat and neck pain.    Eyes:  Negative for blurred vision.   Respiratory:  Negative for cough, sputum production, shortness of breath and wheezing.    Cardiovascular:  Negative for chest pain, palpitations, orthopnea and leg swelling.   Gastrointestinal:  Negative for heartburn, nausea, vomiting and abdominal pain.   Genitourinary:  Negative for dysuria, urgency and frequency.   Musculoskeletal: positive neck pain, Negative for back pain and joint pain.   Skin:  Negative for rash and itching.   Neurological: positive headache Negative for dizziness, tingling, tremors, sensory change, focal weakness  Endo/Heme/Allergies:  Does not bruise/bleed easily.   Psychiatric/Behavioral: anxiety, depression well managed Negative for memory loss.     All other systems reviewed and are negative except as in HPI.    Exam: /84   Pulse 89   Temp 36.6 °C (97.9 °F) (Temporal)   Resp 16   Ht 1.727 m (5' 8\")   Wt (!) 126.6 kg (279 lb)   SpO2 97%   General:  Normal appearing. No distress.  HEENT:  Normocephalic. Eyes conjunctiva clear lids without ptosis, pupils equal and reactive to light accommodation, ears normal shape and contour, canals are clear bilaterally, tympanic membrane benign left,Right with purulent effusion, bulging eardrum, erythema in the " canal , nasal mucosa benign, oropharynx is without erythema, edema or exudates.   Pulmonary:  Clear to ausculation.  Normal effort. No rales, ronchi, or wheezing.  Cardiovascular:  Regular rate and rhythm without murmur. Carotid and radial pulses are intact and equal bilaterally.  Neurologic:  Grossly nonfocal  Skin:  Warm and dry.  No obvious lesions.  Musculoskeletal:  Normal gait. No extremity cyanosis, clubbing, or edema. Tenderness paraspinous muscles of neck bilaterally, spasm noted.   Psych:  Normal mood and affect. Alert and oriented x3. Judgment and insight is normal.    PLAN:  1. PCOS (polycystic ovarian syndrome)  Currently managed with exercise and diet changes. Patient will continue to avoid sugar and salt.    2. Severe obesity (BMI >= 40) (HCC)  Patient will manage a better diet and exercise regimen    3. Adjustment reaction with anxiety and depression  4. Migraine without aura and without status migrainosus, not intractable  5. Stress headaches  6. Bruxism (teeth grinding)  7. Grief reaction  Patient has a history of migraines without aura. These are normally triggered by stress, located across the temples and well managed with Excedrin. Since the death of a dog 2 weeks ago, patient has developed excessive teeth grinding and tension headaches at the base of her neck up into the back of her head. We discussed placing her on a Flexeril regimen to help manage this neck stiffness and subsequent pain to see if it resolves the headaches.  - cyclobenzaprine (FLEXERIL) 5 MG tablet; Take 1-2 Tabs by mouth 3 times a day as needed for Muscle Spasms.  Dispense: 30 Tab; Refill: 0    8. Excessive daytime sleepiness  9. Snoring  Patient has a strong family history of sleep apnea, snores herself and has been experiencing daytime sleepiness. I have placed a referral to sleep studies to evalaute further for any sleep apnea.  - REFERRAL TO SLEEP STUDIES    10. Asthma due to environmental allergies  Managed well with  albuterol inhaler as needed.    11. General medical exam  - CBC WITH DIFFERENTIAL; Future  - Comp Metabolic Panel; Future  - Lipid Profile; Future    12. Cerumen debris on tympanic membrane of right ear  13. Acute suppurative otitis media of right ear without spontaneous rupture of tympanic membrane, recurrence not specified  Cerumen removed in clinic today. Right TM does indicate mild infection for which she will be placed on antibiotics  - amoxicillin (AMOXIL) 500 MG Cap; Take 1 Cap by mouth 3 times a day.  Dispense: 30 Cap; Refill: 0    Please note that this dictation was created using voice recognition software. I have made every reasonable attempt to correct obvious errors, but I expect that there are errors of grammar and possibly content that I did not discover before finalizing the note.      Assessment/Plan  1. PCOS (polycystic ovarian syndrome)     2. Severe obesity (BMI >= 40) (HCC)     3. Adjustment reaction with anxiety and depression     4. Migraine without aura and without status migrainosus, not intractable     5. Stress headaches  cyclobenzaprine (FLEXERIL) 5 MG tablet   6. Bruxism (teeth grinding)  cyclobenzaprine (FLEXERIL) 5 MG tablet   7. Grief reaction     8. Excessive daytime sleepiness  REFERRAL TO SLEEP STUDIES   9. Snoring  REFERRAL TO SLEEP STUDIES   10. Asthma due to environmental allergies     11. General medical exam  CBC WITH DIFFERENTIAL    Comp Metabolic Panel    Lipid Profile   12. Cerumen debris on tympanic membrane of right ear           I have placed the below orders and discussed them with an approved delegating provider. The MA is performing the below orders under the direction of Dr. Peres.       Missy ADRIAN (Scribe), am scribing for, and in the presence of, ISMA Black    Electronically signed by: Missy Parada (Scribe), 4/24/2019    Otis ADRIAN APRN personally performed the services described in this documentation, as scribed by  Missy Parada in my presence, and it is both accurate and complete.

## 2019-05-22 ENCOUNTER — OFFICE VISIT (OUTPATIENT)
Dept: MEDICAL GROUP | Facility: PHYSICIAN GROUP | Age: 19
End: 2019-05-22
Payer: COMMERCIAL

## 2019-05-22 VITALS
HEART RATE: 77 BPM | SYSTOLIC BLOOD PRESSURE: 122 MMHG | HEIGHT: 68 IN | DIASTOLIC BLOOD PRESSURE: 82 MMHG | RESPIRATION RATE: 16 BRPM | OXYGEN SATURATION: 94 % | BODY MASS INDEX: 42.28 KG/M2 | TEMPERATURE: 97.7 F | WEIGHT: 279 LBS

## 2019-05-22 DIAGNOSIS — E66.01 MORBID OBESITY WITH BMI OF 40.0-44.9, ADULT (HCC): ICD-10-CM

## 2019-05-22 DIAGNOSIS — F45.41 STRESS HEADACHES: ICD-10-CM

## 2019-05-22 DIAGNOSIS — Z23 NEED FOR VACCINATION: ICD-10-CM

## 2019-05-22 DIAGNOSIS — M72.2 PLANTAR FASCIITIS: ICD-10-CM

## 2019-05-22 DIAGNOSIS — G43.009 MIGRAINE WITHOUT AURA AND WITHOUT STATUS MIGRAINOSUS, NOT INTRACTABLE: ICD-10-CM

## 2019-05-22 DIAGNOSIS — J45.909 ASTHMA DUE TO ENVIRONMENTAL ALLERGIES: ICD-10-CM

## 2019-05-22 DIAGNOSIS — F45.8 BRUXISM (TEETH GRINDING): ICD-10-CM

## 2019-05-22 PROCEDURE — 90472 IMMUNIZATION ADMIN EACH ADD: CPT | Performed by: NURSE PRACTITIONER

## 2019-05-22 PROCEDURE — 90471 IMMUNIZATION ADMIN: CPT | Performed by: NURSE PRACTITIONER

## 2019-05-22 PROCEDURE — 90621 MENB-FHBP VACC 2/3 DOSE IM: CPT | Performed by: NURSE PRACTITIONER

## 2019-05-22 PROCEDURE — 99214 OFFICE O/P EST MOD 30 MIN: CPT | Mod: 25 | Performed by: NURSE PRACTITIONER

## 2019-05-22 PROCEDURE — 90651 9VHPV VACCINE 2/3 DOSE IM: CPT | Performed by: NURSE PRACTITIONER

## 2019-05-22 PROCEDURE — 90732 PPSV23 VACC 2 YRS+ SUBQ/IM: CPT | Performed by: NURSE PRACTITIONER

## 2019-05-22 NOTE — PROGRESS NOTES
Chief Complaint   Patient presents with   • Follow-Up     lab review, ear infection   • Ankle Pain     due to walking for job       HISTORY OF THE PRESENT ILLNESS: This is a 18 y.o. female established patient who presents today for follow up on lab results and to discuss ankle pain as well as her medical problems.    Stress headaches  Bruxism (teeth grinding)  Migraine without aura and without status migrainosus, not intractable  Discussed lab results with patient as seen below which are overall unrevealing. Patient states her stress headaches and stiff neck related to bruxism resolved after taking flexeril for the first 3 days. States her chronic migraines have been stable overall. She does sometimes have migraines as work which is improved with Excedrin. No reports of any focal weakness.     Asthma due to environmental allergies  Chronic. Patient states her allergies have been well-controlled with zyrtec. She also has an inhaler. Denies needing any refills for the inhaler. She notes that she just got her referral information for the sleep study yesterday and will be scheduling the sleep study soon.    Plantar fasciitis  This is a new problem. 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 She has been double shifts in the last two weeks and has been having bilateral ankle pain in that time period. She has also been having plantar fasciitis sharp and stabbing pain to both of her feet when she gets up from bed.    Discussed recent lab results with patient as seen below which are overall normal. MCHC was 32.4. Total cholesterol was 206, LDL of 130. WBC was 10. In terms of her general health, she states she has been feeling well overall.    Past Medical History:   Diagnosis Date   • Allergy    • Anxiety     seeing a psychologist   • Depression     seeing psychologist   • Migraine     once a week - excedrin   • Polycystic ovarian syndrome     diagnosed 2012       Past Surgical History:  "  Procedure Laterality Date   • DENTAL EXTRACTION(S)         Family Status   Relation Status   • Fa Alive   • Mo Alive   • Sis Alive   • Bro Alive   • PGMo Alive   • PGFa Alive     Family History   Problem Relation Age of Onset   • Heart Disease Father 43        MI   • Hyperlipidemia Father    • Cancer Paternal Grandmother         possible melanoma       Social History   Substance Use Topics   • Smoking status: Never Smoker   • Smokeless tobacco: Never Used   • Alcohol use No       Allergies: Patient has no known allergies.    Current Outpatient Prescriptions Ordered in [facility]   Medication Sig Dispense Refill   • cyclobenzaprine (FLEXERIL) 5 MG tablet Take 1-2 Tabs by mouth 3 times a day as needed for Muscle Spasms. 30 Tab 0   • amoxicillin (AMOXIL) 500 MG Cap Take 1 Cap by mouth 3 times a day. 30 Cap 0   • albuterol 108 (90 BASE) MCG/ACT Aero Soln inhalation aerosol Inhale 2 Puffs by mouth every 6 hours as needed for Shortness of Breath. 8.5 g 1     No current [facility]-ordered facility-administered medications on file.        Review of Systems   Constitutional: Negative for fever, chills, weight loss and malaise/fatigue.   Respiratory: Allergy symptoms. Negative for cough, sputum production, shortness of breath and wheezing.    Cardiovascular: Negative for chest pain, palpitations, orthopnea and leg swelling.   Musculoskeletal: Bilateral ankle pain and foot pain (plantar fasciitis). Negative for active stiff neck, back pain.  Neurological: Chronic migraines (stable). Negative for dizziness, tingling, tremors, sensory change, focal weakness.    All other systems reviewed and are negative except as in HPI.    Exam: /82   Pulse 77   Temp 36.5 °C (97.7 °F)   Resp 16   Ht 1.727 m (5' 8\")   Wt (!) 126.6 kg (279 lb)   SpO2 94%   General:  Normal appearing. No distress.  HEENT: Normocephalic. Ears normal shape and contour, canals are clear bilaterally, tympanic membranes are benign.  Pulmonary:  Clear to ausculation. "  Normal effort. No rales, ronchi, or wheezing.  Cardiovascular:  Regular rate and rhythm without murmur. Carotid and radial pulses are intact and equal bilaterally.  Neurologic:  Grossly nonfocal  Skin:  Warm and dry.  No obvious lesions.  Musculoskeletal:  Normal gait. No extremity cyanosis, clubbing, or edema. Full range of motion of bilateral ankles.  Psych:  Normal mood and affect. Alert and oriented x3. Judgment and insight is normal.    Labs:  Hospital Outpatient Visit on 04/24/2019   Component Date Value Ref Range Status   • WBC 04/24/2019 10.0  4.8 - 10.8 K/uL Final   • RBC 04/24/2019 5.37  4.20 - 5.40 M/uL Final   • Hemoglobin 04/24/2019 15.2  12.0 - 16.0 g/dL Final   • Hematocrit 04/24/2019 46.9  37.0 - 47.0 % Final   • MCV 04/24/2019 87.3  81.4 - 97.8 fL Final   • MCH 04/24/2019 28.3  27.0 - 33.0 pg Final   • MCHC 04/24/2019 32.4* 33.6 - 35.0 g/dL Final   • RDW 04/24/2019 43.9  35.9 - 50.0 fL Final   • Platelet Count 04/24/2019 339  164 - 446 K/uL Final   • MPV 04/24/2019 9.5  9.0 - 12.9 fL Final   • Neutrophils-Polys 04/24/2019 62.90  44.00 - 72.00 % Final   • Lymphocytes 04/24/2019 26.30  22.00 - 41.00 % Final   • Monocytes 04/24/2019 8.30  0.00 - 13.40 % Final   • Eosinophils 04/24/2019 1.60  0.00 - 6.90 % Final   • Basophils 04/24/2019 0.50  0.00 - 1.80 % Final   • Immature Granulocytes 04/24/2019 0.40  0.00 - 0.90 % Final   • Nucleated RBC 04/24/2019 0.00  /100 WBC Final   • Neutrophils (Absolute) 04/24/2019 6.32  2.00 - 7.15 K/uL Final    Includes immature neutrophils, if present.   • Lymphs (Absolute) 04/24/2019 2.64  1.00 - 4.80 K/uL Final   • Monos (Absolute) 04/24/2019 0.83  0.00 - 0.85 K/uL Final   • Eos (Absolute) 04/24/2019 0.16  0.00 - 0.51 K/uL Final   • Baso (Absolute) 04/24/2019 0.05  0.00 - 0.12 K/uL Final   • Immature Granulocytes (abs) 04/24/2019 0.04  0.00 - 0.11 K/uL Final   • NRBC (Absolute) 04/24/2019 0.00  K/uL Final   • Sodium 04/24/2019 136  135 - 145 mmol/L Final   • Potassium  04/24/2019 3.8  3.6 - 5.5 mmol/L Final   • Chloride 04/24/2019 104  96 - 112 mmol/L Final   • Co2 04/24/2019 24  20 - 33 mmol/L Final   • Anion Gap 04/24/2019 8.0  0.0 - 11.9 Final   • Glucose 04/24/2019 87  65 - 99 mg/dL Final   • Bun 04/24/2019 14  8 - 22 mg/dL Final   • Creatinine 04/24/2019 0.63  0.50 - 1.40 mg/dL Final   • Calcium 04/24/2019 9.6  8.5 - 10.5 mg/dL Final   • AST(SGOT) 04/24/2019 32  12 - 45 U/L Final   • ALT(SGPT) 04/24/2019 41  2 - 50 U/L Final   • Alkaline Phosphatase 04/24/2019 96  30 - 99 U/L Final   • Total Bilirubin 04/24/2019 0.6  0.1 - 1.5 mg/dL Final   • Albumin 04/24/2019 4.4  3.2 - 4.9 g/dL Final   • Total Protein 04/24/2019 7.7  6.0 - 8.2 g/dL Final   • Globulin 04/24/2019 3.3  1.9 - 3.5 g/dL Final   • A-G Ratio 04/24/2019 1.3  g/dL Final   • Cholesterol,Tot 04/24/2019 206* 100 - 199 mg/dL Final   • Triglycerides 04/24/2019 107  0 - 149 mg/dL Final   • HDL 04/24/2019 55  >=40 mg/dL Final   • LDL 04/24/2019 130* <100 mg/dL Final   • Fasting Status 04/24/2019 Fasting   Final   • GFR If  04/24/2019 >60  >60 mL/min/1.73 m 2 Final   • GFR If Non  04/24/2019 >60  >60 mL/min/1.73 m 2 Final          PLAN:    1. Stress headaches  2. Bruxism (teeth grinding)  - Patient reports that the stress headaches and neck stiffness have resolved after taking flexeril. States she still sometimes grinds her teeth at night but not as much    3. Migraine without aura and without status migrainosus, not intractable  - Stable on Excedrin. Continue current plan of care and medications.     4. Morbid obesity with BMI of 40.0-44.9, adult (HCC)  - Patient identified as having a weight management issue. Appropriate orders and counseling given.     5. Asthma due to environmental allergies  - Well-controlled on zyrtec. Continue current plan of care and medications.     6. Plantar fasciitis  - Supportive care instructions provided. Advised patient to roll a textured frozen water bottle  with her feet. Advised using shoes with good arch support. Recommended taking Tylenol and ibuprofen as needed. Informed patient the pain should improve within 6 weeks. Informed her we will consider referral to sports medicine and/or physical therapy if her symptoms fail to improve by then.    7. Need for vaccination  - Patient will get the following vaccinations.  - PneumoVax PPV23 =>3yo  - Meningococcal Vaccine Serogroup B 2-3 Dose IM  - HPV Vaccine Quad 3-Dose IM     Discussed lab results with patient which were overall normal. MCHC was slightly low possibly indicating very mild iron deficiency. Advised checking if her multivitamin has iron.     Patient is encouraged to be seen in the emergency room for chest pain, palpitations, shortness of breath, dizziness, severe abdominal pain or other concerning symptoms.     Please note that this dictation was created using voice recognition software. I have made every reasonable attempt to correct obvious errors, but I expect that there are errors of grammar and possibly content that I did not discover before finalizing the note.      Assessment/Plan  1. Stress headaches     2. Bruxism (teeth grinding)     3. Migraine without aura and without status migrainosus, not intractable     4. Morbid obesity with BMI of 40.0-44.9, adult (HCC)     5. Asthma due to environmental allergies     6. Plantar fasciitis     7. Need for vaccination  PneumoVax PPV23 =>3yo    Meningococcal Vaccine Serogroup B 2-3 Dose IM    HPV Vaccine Quad 3-Dose IM         I have placed the below orders and discussed them with an approved delegating provider. The MA is performing the below orders under the direction of Dr. Peres.       Francis ADRIAN (Scribe), am scribing for, and in the presence of, ISMA Black    Electronically signed by: Francis Black (Kirt), 5/22/2019    Otis ADRIAN APRN personally performed the services described in this  documentation, as scribed by Francis Black in my presence, and it is both accurate and complete.

## 2019-08-15 ENCOUNTER — OFFICE VISIT (OUTPATIENT)
Dept: MEDICAL GROUP | Facility: PHYSICIAN GROUP | Age: 19
End: 2019-08-15
Payer: COMMERCIAL

## 2019-08-15 VITALS
HEART RATE: 83 BPM | DIASTOLIC BLOOD PRESSURE: 74 MMHG | WEIGHT: 277 LBS | TEMPERATURE: 98 F | HEIGHT: 68 IN | RESPIRATION RATE: 14 BRPM | OXYGEN SATURATION: 97 % | BODY MASS INDEX: 41.98 KG/M2 | SYSTOLIC BLOOD PRESSURE: 126 MMHG

## 2019-08-15 DIAGNOSIS — Z23 NEED FOR VACCINATION: ICD-10-CM

## 2019-08-15 DIAGNOSIS — F45.8 BRUXISM (TEETH GRINDING): ICD-10-CM

## 2019-08-15 DIAGNOSIS — N61.0 MASTITIS, RIGHT, ACUTE: ICD-10-CM

## 2019-08-15 DIAGNOSIS — H61.891 IRRITATION OF EXTERNAL EAR CANAL, RIGHT: ICD-10-CM

## 2019-08-15 DIAGNOSIS — N63.10 LUMP OF RIGHT BREAST: ICD-10-CM

## 2019-08-15 DIAGNOSIS — G43.009 MIGRAINE WITHOUT AURA AND WITHOUT STATUS MIGRAINOSUS, NOT INTRACTABLE: ICD-10-CM

## 2019-08-15 PROCEDURE — 90471 IMMUNIZATION ADMIN: CPT | Performed by: NURSE PRACTITIONER

## 2019-08-15 PROCEDURE — 99214 OFFICE O/P EST MOD 30 MIN: CPT | Mod: 25 | Performed by: NURSE PRACTITIONER

## 2019-08-15 PROCEDURE — 90651 9VHPV VACCINE 2/3 DOSE IM: CPT | Performed by: NURSE PRACTITIONER

## 2019-08-15 RX ORDER — SULFAMETHOXAZOLE AND TRIMETHOPRIM 800; 160 MG/1; MG/1
1 TABLET ORAL 2 TIMES DAILY
Qty: 14 TAB | Refills: 0 | Status: SHIPPED | OUTPATIENT
Start: 2019-08-15 | End: 2019-08-22

## 2019-08-15 NOTE — PROGRESS NOTES
Chief Complaint   Patient presents with   • Breast Mass     R; TOP; x 1 week; size change; px 5    • Otalgia     R ; x 4 days; after swimming        HISTORY OF THE PRESENT ILLNESS: This is a 19 y.o. female established patient who presents today for follow up and management of:    Lump of right breast  Mastitis, right, acute  This is a new problem. Patient reports having a right breast mass which started one week ago. States the area is painful and sensitive. The lump seems to have gotten smaller, but she she still has pain and is unsure whether or not the lump is normal. Denies any red streaking or discharge from the nipple. Denies any left breast symptoms.    Migraine without aura and without status migrainosus, not intractable  Bruxism (teeth grinding)  Patient has chronic history of migraines and bruxism. She reports getting her braces removed and has been wearing a retainer. States the retainer has kept her from grinding her teeth at night which has kept her migraines away.     Irritation of external ear canal, right  New problem. Patient reports having right ear pain over the last 4 days. States her seasonal allergies causes cerumen build up and inflammation in her ears about 1-2 times per year. States the right ear pain worsened after she went swimming. She has used ear drops without significant improvement. She took a dose of amoxicillin from a prior ear infection which did not help. No reports of any recent fevers.    Need for vaccination  Patient is due for HPV vaccine.    She reports having intermittent rashes on her arms that occur when she is stressed out. States they look like insect bites but are not insect bites. They are not itchy. She notes her sister has history of eczema.      Past Medical History:   Diagnosis Date   • Allergy    • Anxiety     seeing a psychologist   • Depression     seeing psychologist   • Migraine     once a week - excedrin   • Polycystic ovarian syndrome     diagnosed 2012        Past Surgical History:   Procedure Laterality Date   • DENTAL EXTRACTION(S)         Family Status   Relation Name Status   • Fa  Alive   • Mo  Alive   • Sis  Alive   • Bro  Alive   • PGMo  Alive   • PGFa  Alive     Family History   Problem Relation Age of Onset   • Heart Disease Father 43        MI   • Hyperlipidemia Father    • Cancer Paternal Grandmother         possible melanoma       Social History     Tobacco Use   • Smoking status: Never Smoker   • Smokeless tobacco: Never Used   Substance Use Topics   • Alcohol use: No   • Drug use: No       Allergies: Patient has no known allergies.    Current Outpatient Medications Ordered in Epic   Medication Sig Dispense Refill   • sulfamethoxazole-trimethoprim (BACTRIM DS) 800-160 MG tablet Take 1 Tab by mouth 2 times a day for 7 days. 14 Tab 0   • albuterol 108 (90 BASE) MCG/ACT Aero Soln inhalation aerosol Inhale 2 Puffs by mouth every 6 hours as needed for Shortness of Breath. 8.5 g 1   • cyclobenzaprine (FLEXERIL) 5 MG tablet Take 1-2 Tabs by mouth 3 times a day as needed for Muscle Spasms. (Patient not taking: Reported on 8/15/2019) 30 Tab 0   • amoxicillin (AMOXIL) 500 MG Cap Take 1 Cap by mouth 3 times a day. (Patient not taking: Reported on 8/15/2019) 30 Cap 0     No current Epic-ordered facility-administered medications on file.        Review of Systems   Constitutional: Negative for fever, chills, weight loss and malaise/fatigue.   HENT: Right ear pain. Negative for nosebleeds, congestion, sore throat and neck pain.    Respiratory: Negative for cough, sputum production, shortness of breath and wheezing.    Cardiovascular: Negative for chest pain, palpitations, orthopnea and leg swelling.   Gastrointestinal: Negative for heartburn, nausea, vomiting and abdominal pain.   Genitourinary: Negative for dysuria, urgency and frequency.   Breast: Right breast lump with pain. Negative for red streaking or nipple discharge.  Skin: Arm rashes (intermittent, with  "stress). Negative for itching.    Neurological: Negative for active headaches, dizziness, tingling, tremors, sensory change, focal weakness  All other systems reviewed and are negative except as in HPI.    Exam: /74 (BP Location: Left arm, Patient Position: Sitting, BP Cuff Size: Adult)   Pulse 83   Temp 36.7 °C (98 °F) (Temporal)   Resp 14   Ht 1.727 m (5' 8\")   Wt (!) 125.6 kg (277 lb)   SpO2 97%   General:  Normal appearing. No distress.  HEENT: Normocephalic. Eyes conjunctiva clear lids without ptosis, pupils equal and reactive to light accommodation, ears normal shape and contour, right ear canal with redness and maceration, ear canal appears damp, no exudates or infectious signs, tympanic membranes are benign, nasal mucosa benign, oropharynx is without erythema, edema or exudates.   Pulmonary:  Clear to ausculation.  Normal effort. No rales, ronchi, or wheezing.  Cardiovascular:  Regular rate and rhythm without murmur. Carotid and radial pulses are intact and equal bilaterally.  Neurologic:  Grossly nonfocal  Lymph: No cervical, supraclavicular or axillary lymph nodes are palpable  Skin:  Warm and dry.  No obvious lesions.  Musculoskeletal:  Normal gait. No extremity cyanosis, clubbing, or edema.  Breast: Breasts examined seated and supine. Left breast is normal. Right breast with tenderness throughout, small lump above area of redenss, patient has area of redness and streaking superior to the right nipple at approximately 11 to 12 o'clock, red open area with scabbing on Reddy's gland located at about 9  o'clock on areola. No peau d'orange or nipple retraction. No discharge. Breast move freely and equally without restriction. No axillary or supraclavicular adenopathy.   Psych:  Normal mood and affect. Alert and oriented x3. Judgment and insight is normal.      PLAN:    1. Need for vaccination  - Patient is due for HPV vaccination.  - 9VHPV Vaccine 2-3 Dose IM    2. Lump of right breast  5. " Mastitis, right, acute  - Patient has a painful right breast lump. She agrees to get ultrasound for further evaluation. Prescribed bactrim, as below. Supportive care instructions and return precautions given.   - US-BREAST LIMITED-RIGHT; Future  - sulfamethoxazole-trimethoprim (BACTRIM DS) 800-160 MG tablet; Take 1 Tab by mouth 2 times a day for 7 days.  Dispense: 14 Tab; Refill: 0    3. Migraine without aura and without status migrainosus, not intractable  4. Bruxism (teeth grinding)  - Now resolved. Patient states wearing her retainer since getting her braces off has kept her from grinding her teeth which has kept her migraines away.     6. Irritation of external ear canal, right  - Physical exam reveals no infectious signs in the ear.  - Advised patient to use over-the-counter after-swimming ear drops. Supportive care instructions and return precautions given.     Follow-up after u/s completed. Patient is encouraged to be seen in the emergency room for chest pain, palpitations, shortness of breath, dizziness, severe abdominal pain or other concerning symptoms.     Please note that this dictation was created using voice recognition software. I have made every reasonable attempt to correct obvious errors, but I expect that there are errors of grammar and possibly content that I did not discover before finalizing the note.      Assessment/Plan  1. Need for vaccination  9VHPV Vaccine 2-3 Dose IM   2. Lump of right breast  US-BREAST LIMITED-RIGHT   3. Migraine without aura and without status migrainosus, not intractable     4. Bruxism (teeth grinding)     5. Mastitis, right, acute  sulfamethoxazole-trimethoprim (BACTRIM DS) 800-160 MG tablet   6. Irritation of external ear canal, right           I have placed the below orders and discussed them with an approved delegating provider. The MA is performing the below orders under the direction of Dr. Peres.       I, Francis Black (Scribe), am scribing for, and in the  presence of, ISMA Black    Electronically signed by: Francis Black (Scribe), 8/15/2019    I, ISMA Black personally performed the services described in this documentation, as scribed by Francis Black in my presence, and it is both accurate and complete.

## 2019-12-12 ENCOUNTER — OFFICE VISIT (OUTPATIENT)
Dept: URGENT CARE | Facility: CLINIC | Age: 19
End: 2019-12-12
Payer: COMMERCIAL

## 2019-12-12 VITALS
OXYGEN SATURATION: 98 % | TEMPERATURE: 98.4 F | RESPIRATION RATE: 16 BRPM | SYSTOLIC BLOOD PRESSURE: 122 MMHG | DIASTOLIC BLOOD PRESSURE: 80 MMHG | HEIGHT: 69 IN | BODY MASS INDEX: 41.18 KG/M2 | WEIGHT: 278 LBS | HEART RATE: 110 BPM

## 2019-12-12 DIAGNOSIS — J01.90 ACUTE NON-RECURRENT SINUSITIS, UNSPECIFIED LOCATION: ICD-10-CM

## 2019-12-12 DIAGNOSIS — J06.9 URI WITH COUGH AND CONGESTION: ICD-10-CM

## 2019-12-12 PROCEDURE — 99214 OFFICE O/P EST MOD 30 MIN: CPT | Performed by: NURSE PRACTITIONER

## 2019-12-12 RX ORDER — AMOXICILLIN AND CLAVULANATE POTASSIUM 875; 125 MG/1; MG/1
1 TABLET, FILM COATED ORAL 2 TIMES DAILY
Qty: 20 TAB | Refills: 0 | Status: SHIPPED | OUTPATIENT
Start: 2019-12-12 | End: 2019-12-22

## 2019-12-12 RX ORDER — METHYLPREDNISOLONE 4 MG/1
TABLET ORAL
Qty: 1 PACKAGE | Refills: 0 | Status: SHIPPED | OUTPATIENT
Start: 2019-12-12 | End: 2022-04-24

## 2019-12-12 ASSESSMENT — ENCOUNTER SYMPTOMS
SINUS PAIN: 1
SINUS PRESSURE: 1
COUGH: 1
CARDIOVASCULAR NEGATIVE: 1
SHORTNESS OF BREATH: 0
NEUROLOGICAL NEGATIVE: 1
SPUTUM PRODUCTION: 1
WHEEZING: 0
FEVER: 0
SORE THROAT: 1

## 2019-12-12 NOTE — LETTER
December 12, 2019         Patient: Jade Saunders   YOB: 2000   Date of Visit: 12/12/2019           To Whom it May Concern:    Jade Saunders was seen in my clinic on 12/12/2019.  Patient may return to work 12/15/2019 or sooner if the patient is feeling better.    If you have any questions or concerns, please don't hesitate to call.        Sincerely,           JOCELYN Haas.  Electronically Signed

## 2019-12-13 NOTE — PROGRESS NOTES
Subjective:     Jade Saunders is a 19 y.o. female who presents for Sinus Problem (x 5 days, nasal congestion, stuffy and runny nose and scrachy throat) and Cough (x today, productive cough, pain behind left ear)       Sinus Problem   This is a new problem. Episode onset: 4 days ago. The problem has been gradually worsening since onset. There has been no fever. The pain is moderate. Associated symptoms include congestion, coughing, ear pain (Left), sinus pressure and a sore throat. Pertinent negatives include no shortness of breath. Treatments tried: Antihistamines, nasal saline sprays, NyQuil. The treatment provided no relief.     Patient reports recently getting over the flu.  Reports a history of sinus infections.    PMH:  has a past medical history of Allergy, Anxiety, Depression, Migraine, and Polycystic ovarian syndrome.    MEDS:   Current Outpatient Medications:   •  amoxicillin-clavulanate (AUGMENTIN) 875-125 MG Tab, Take 1 Tab by mouth 2 times a day for 10 days., Disp: 20 Tab, Rfl: 0  •  methylPREDNISolone (MEDROL DOSEPAK) 4 MG Tablet Therapy Pack, Use as directed on package., Disp: 1 Package, Rfl: 0  •  cyclobenzaprine (FLEXERIL) 5 MG tablet, Take 1-2 Tabs by mouth 3 times a day as needed for Muscle Spasms. (Patient not taking: Reported on 8/15/2019), Disp: 30 Tab, Rfl: 0  •  amoxicillin (AMOXIL) 500 MG Cap, Take 1 Cap by mouth 3 times a day. (Patient not taking: Reported on 8/15/2019), Disp: 30 Cap, Rfl: 0  •  albuterol 108 (90 BASE) MCG/ACT Aero Soln inhalation aerosol, Inhale 2 Puffs by mouth every 6 hours as needed for Shortness of Breath. (Patient not taking: Reported on 12/12/2019), Disp: 8.5 g, Rfl: 1    ALLERGIES: No Known Allergies    SURGHX:   Past Surgical History:   Procedure Laterality Date   • DENTAL EXTRACTION(S)       SOCHX:  reports that she has never smoked. She has never used smokeless tobacco. She reports that she does not drink alcohol or use drugs.     FH: Reviewed with patient,  "not pertinent to this visit.    Review of Systems   Constitutional: Positive for malaise/fatigue. Negative for fever.   HENT: Positive for congestion, ear pain (Left), sinus pressure, sinus pain and sore throat.    Respiratory: Positive for cough and sputum production. Negative for shortness of breath and wheezing.    Cardiovascular: Negative.    Neurological: Negative.    All other systems reviewed and are negative.    Objective:     /80 (BP Location: Left arm, Patient Position: Sitting, BP Cuff Size: Large adult)   Pulse (!) 110   Temp 36.9 °C (98.4 °F) (Temporal)   Resp 16   Ht 1.753 m (5' 9\")   Wt (!) 126.1 kg (278 lb)   SpO2 98%   BMI 41.05 kg/m²     Physical Exam  Vitals signs reviewed.   Constitutional:       General: She is not in acute distress.     Appearance: She is well-developed. She is ill-appearing. She is not toxic-appearing or diaphoretic.   HENT:      Head: Normocephalic.      Right Ear: External ear normal. Tympanic membrane is not perforated, erythematous or bulging.      Left Ear: External ear normal. Tympanic membrane is not perforated, erythematous or bulging.      Nose: Mucosal edema and rhinorrhea present. Rhinorrhea is purulent.      Right Sinus: Maxillary sinus tenderness present.      Left Sinus: Maxillary sinus tenderness present.      Mouth/Throat:      Mouth: Mucous membranes are moist.      Pharynx: Uvula midline. Pharyngeal swelling and posterior oropharyngeal erythema present.      Comments: Postnasal drip  Eyes:      Extraocular Movements: Extraocular movements intact.      Conjunctiva/sclera: Conjunctivae normal.      Pupils: Pupils are equal, round, and reactive to light.   Neck:      Musculoskeletal: Normal range of motion.   Cardiovascular:      Rate and Rhythm: Regular rhythm. Tachycardia present.      Pulses: Normal pulses.      Heart sounds: Normal heart sounds.   Pulmonary:      Effort: Pulmonary effort is normal. No respiratory distress.      Breath sounds: " Normal breath sounds. No decreased breath sounds, wheezing, rhonchi or rales.   Abdominal:      General: Bowel sounds are normal.      Palpations: Abdomen is soft.      Tenderness: There is no tenderness.   Musculoskeletal: Normal range of motion.         General: No deformity.   Skin:     General: Skin is warm and dry.      Capillary Refill: Capillary refill takes less than 2 seconds.      Coloration: Skin is not pale.   Neurological:      Mental Status: She is alert and oriented to person, place, and time.      Sensory: No sensory deficit.      Coordination: Coordination normal.   Psychiatric:         Behavior: Behavior normal. Behavior is cooperative.          Assessment/Plan:     1. Acute non-recurrent sinusitis, unspecified location  - amoxicillin-clavulanate (AUGMENTIN) 875-125 MG Tab; Take 1 Tab by mouth 2 times a day for 10 days.  Dispense: 20 Tab; Refill: 0  - methylPREDNISolone (MEDROL DOSEPAK) 4 MG Tablet Therapy Pack; Use as directed on package.  Dispense: 1 Package; Refill: 0    2. URI with cough and congestion    Rx as above sent electronically. Advised to avoid NSAIDs while on steroid therapy.    Discussed OTC antihistamines, Flonase, and nasal saline rinses/neti pot.     Discussed close monitoring and supportive measures including increasing fluids and rest as well as OTC symptom management.    Work note provided.    Vital signs stable, afebrile, asymptomatic, no acute distress.    Warning signs reviewed. Return precautions discussed.    Patient advised to: Return for 1) Symptoms don't improve or worsen, or go to ER, 2) Follow up with primary care in 7-10 days.    Differential diagnosis, natural history, supportive care, and indications for immediate follow-up discussed. All questions answered. Patient agrees with the plan of care.

## 2020-01-15 ENCOUNTER — OFFICE VISIT (OUTPATIENT)
Dept: DERMATOLOGY | Facility: IMAGING CENTER | Age: 20
End: 2020-01-15
Payer: COMMERCIAL

## 2020-01-15 VITALS — BODY MASS INDEX: 38.51 KG/M2 | HEIGHT: 69 IN | TEMPERATURE: 96.9 F | WEIGHT: 260 LBS

## 2020-01-15 DIAGNOSIS — L91.8 ACROCHORDON: ICD-10-CM

## 2020-01-15 NOTE — PROGRESS NOTES
Dermatology New Patient Visit    Chief Complaint   Patient presents with   • Establish Care       Subjective:     HPI:   Jade Saunders is a 19 y.o. female presenting for    Discuss possibility removing skin tags around neck  Had removed in the past   History of skin cancer: No  History of biopsies:No  History of blistering/severe sunburns:No  Family history of skin cancer:Yes, Details: grandmother unknown   Family history of atypical moles:No    Tobacco use: Never  Alcohol use:denies alcohol consumption  Allergies:No          Past Medical History:   Diagnosis Date   • Allergy    • Anxiety     seeing a psychologist   • Depression     seeing psychologist   • Migraine     once a week - excedrin   • Polycystic ovarian syndrome     diagnosed 2012       Current Outpatient Medications on File Prior to Visit   Medication Sig Dispense Refill   • albuterol 108 (90 BASE) MCG/ACT Aero Soln inhalation aerosol Inhale 2 Puffs by mouth every 6 hours as needed for Shortness of Breath. 8.5 g 1   • methylPREDNISolone (MEDROL DOSEPAK) 4 MG Tablet Therapy Pack Use as directed on package. (Patient not taking: Reported on 1/15/2020) 1 Package 0   • cyclobenzaprine (FLEXERIL) 5 MG tablet Take 1-2 Tabs by mouth 3 times a day as needed for Muscle Spasms. (Patient not taking: Reported on 8/15/2019) 30 Tab 0   • amoxicillin (AMOXIL) 500 MG Cap Take 1 Cap by mouth 3 times a day. (Patient not taking: Reported on 8/15/2019) 30 Cap 0     No current facility-administered medications on file prior to visit.        No Known Allergies    Family History   Problem Relation Age of Onset   • Heart Disease Father 43        MI   • Hyperlipidemia Father    • Cancer Paternal Grandmother         possible melanoma       Social History     Socioeconomic History   • Marital status: Single     Spouse name: Not on file   • Number of children: Not on file   • Years of education: Not on file   • Highest education level: Not on file   Occupational History   • Not  on file   Social Needs   • Financial resource strain: Not on file   • Food insecurity:     Worry: Not on file     Inability: Not on file   • Transportation needs:     Medical: Not on file     Non-medical: Not on file   Tobacco Use   • Smoking status: Never Smoker   • Smokeless tobacco: Never Used   Substance and Sexual Activity   • Alcohol use: No   • Drug use: No   • Sexual activity: Never   Lifestyle   • Physical activity:     Days per week: Not on file     Minutes per session: Not on file   • Stress: Not on file   Relationships   • Social connections:     Talks on phone: Not on file     Gets together: Not on file     Attends Nondenominational service: Not on file     Active member of club or organization: Not on file     Attends meetings of clubs or organizations: Not on file     Relationship status: Not on file   • Intimate partner violence:     Fear of current or ex partner: Not on file     Emotionally abused: Not on file     Physically abused: Not on file     Forced sexual activity: Not on file   Other Topics Concern   • Behavioral problems No   • Interpersonal relationships No   • Sad or not enjoying activities No   • Suicidal thoughts No   • Poor school performance No   • Reading difficulties No   • Speech difficulties No   • Writing difficulties No   • Inadequate sleep Yes   • Excessive TV viewing No   • Excessive video game use No   • Inadequate exercise Yes   • Sports related No   • Poor diet No   • Family concerns for drug/alcohol abuse No   • Poor oral hygiene No   • Bike safety No   • Family concerns vehicle safety No   Social History Narrative   • Not on file       ROS     Objective:     A {kmskinexamfullfocused:20740} exam was completed including: scalp, hair, ears, face, eyelids, conjunctiva, lips, gums/tongue/oropharynx***, neck, {KMTSECB:20696}, abdomen, back, left and right upper extremities (including {KMTSEhands:20697}), left and right lower extremities (including {kmtsefeet:20698}), buttocks,  "{[facility]:18163} external genitalia (patient refusal***) with the following pertinent findings listed below. Remaining above-listed examined areas within normal limits / negative for rashes or lesions.    Temp 36.1 °C (96.9 °F)   Ht 1.753 m (5' 9\")   Wt 117.9 kg (260 lb)     Physical Exam    DATA: none applicable to review***    Assessment and Plan:     There are no diagnoses linked to this encounter.    Followup: No follow-ups on file.    JOCELYN Gabriel.        "

## 2022-04-24 ENCOUNTER — OFFICE VISIT (OUTPATIENT)
Dept: URGENT CARE | Facility: CLINIC | Age: 22
End: 2022-04-24
Payer: COMMERCIAL

## 2022-04-24 ENCOUNTER — HOSPITAL ENCOUNTER (OUTPATIENT)
Facility: MEDICAL CENTER | Age: 22
End: 2022-04-24
Attending: FAMILY MEDICINE
Payer: COMMERCIAL

## 2022-04-24 VITALS
WEIGHT: 290 LBS | DIASTOLIC BLOOD PRESSURE: 78 MMHG | RESPIRATION RATE: 16 BRPM | OXYGEN SATURATION: 93 % | BODY MASS INDEX: 42.95 KG/M2 | SYSTOLIC BLOOD PRESSURE: 130 MMHG | HEART RATE: 128 BPM | TEMPERATURE: 100.6 F | HEIGHT: 69 IN

## 2022-04-24 DIAGNOSIS — Z20.822 SUSPECTED COVID-19 VIRUS INFECTION: ICD-10-CM

## 2022-04-24 DIAGNOSIS — J45.909 ASTHMA WITHOUT ACUTE EXACERBATION: ICD-10-CM

## 2022-04-24 DIAGNOSIS — J10.1 INFLUENZA A: ICD-10-CM

## 2022-04-24 LAB
COVID ORDER STATUS COVID19: NORMAL
FLUAV+FLUBV AG SPEC QL IA: POSITIVE
INT CON NEG: NORMAL
INT CON POS: NORMAL

## 2022-04-24 PROCEDURE — 87804 INFLUENZA ASSAY W/OPTIC: CPT | Performed by: FAMILY MEDICINE

## 2022-04-24 PROCEDURE — 99214 OFFICE O/P EST MOD 30 MIN: CPT | Mod: CS | Performed by: FAMILY MEDICINE

## 2022-04-24 PROCEDURE — U0003 INFECTIOUS AGENT DETECTION BY NUCLEIC ACID (DNA OR RNA); SEVERE ACUTE RESPIRATORY SYNDROME CORONAVIRUS 2 (SARS-COV-2) (CORONAVIRUS DISEASE [COVID-19]), AMPLIFIED PROBE TECHNIQUE, MAKING USE OF HIGH THROUGHPUT TECHNOLOGIES AS DESCRIBED BY CMS-2020-01-R: HCPCS

## 2022-04-24 PROCEDURE — U0005 INFEC AGEN DETEC AMPLI PROBE: HCPCS

## 2022-04-24 RX ORDER — OSELTAMIVIR PHOSPHATE 75 MG/1
75 CAPSULE ORAL 2 TIMES DAILY
Qty: 10 CAPSULE | Refills: 0 | Status: SHIPPED | OUTPATIENT
Start: 2022-04-24 | End: 2023-03-30

## 2022-04-24 RX ORDER — ALBUTEROL SULFATE 90 UG/1
2 AEROSOL, METERED RESPIRATORY (INHALATION) EVERY 6 HOURS PRN
Qty: 8.5 G | Refills: 1 | Status: SHIPPED | OUTPATIENT
Start: 2022-04-24

## 2022-04-24 ASSESSMENT — ENCOUNTER SYMPTOMS
SHORTNESS OF BREATH: 1
COUGH: 1
FEVER: 1
MYALGIAS: 1
VOMITING: 0

## 2022-04-24 NOTE — PROGRESS NOTES
"Subjective:     Jade Saunders is a 21 y.o. female who presents for Cough (Began yesterday around noon, headache, sob, body aches)    HPI  Pt presents for evaluation of an acute problem  Patient with cough which started yesterday  Having headache, shortness of breath, and body aches  Symptoms started suddenly and rapidly worsened this morning   Feeling fatigued  History of asthma and normally has an albuterol inhaler, however has recently run out  Father ill with similar symptoms  Father starting to improve on his own    Review of Systems   Constitutional: Positive for fever and malaise/fatigue.   HENT: Positive for congestion.    Respiratory: Positive for cough and shortness of breath.    Gastrointestinal: Negative for vomiting.   Musculoskeletal: Positive for myalgias.   Skin: Negative for rash.       PMH:  has a past medical history of Allergy, Anxiety, Depression, Migraine, and Polycystic ovarian syndrome.  MEDS:   Current Outpatient Medications:   •  albuterol 108 (90 BASE) MCG/ACT Aero Soln inhalation aerosol, Inhale 2 Puffs by mouth every 6 hours as needed for Shortness of Breath., Disp: 8.5 g, Rfl: 1  ALLERGIES: No Known Allergies  SURGHX:   Past Surgical History:   Procedure Laterality Date   • DENTAL EXTRACTION(S)       SOCHX:  reports that she has never smoked. She has never used smokeless tobacco. She reports that she does not drink alcohol and does not use drugs.     Objective:   /78 (BP Location: Left arm, Patient Position: Sitting, BP Cuff Size: Adult)   Pulse (!) 128   Temp (!) 38.1 °C (100.6 °F) (Temporal)   Resp 16   Ht 1.753 m (5' 9\")   Wt (!) 132 kg (290 lb)   SpO2 93%   BMI 42.83 kg/m²     Physical Exam  Constitutional:       Appearance: She is well-developed. She is ill-appearing. She is not toxic-appearing.   HENT:      Head: Normocephalic and atraumatic.      Right Ear: Tympanic membrane, ear canal and external ear normal.      Left Ear: Tympanic membrane, ear canal and " external ear normal.      Nose: Congestion and rhinorrhea present.      Mouth/Throat:      Mouth: Mucous membranes are moist.      Pharynx: Oropharynx is clear. No oropharyngeal exudate or posterior oropharyngeal erythema.   Neck:      Trachea: No tracheal deviation.   Cardiovascular:      Rate and Rhythm: Regular rhythm. Tachycardia present.   Pulmonary:      Effort: Pulmonary effort is normal. No respiratory distress.      Breath sounds: Normal breath sounds. No wheezing or rales.   Musculoskeletal:      Cervical back: Normal range of motion and neck supple. No tenderness.   Lymphadenopathy:      Cervical: No cervical adenopathy.   Skin:     General: Skin is warm and dry.      Findings: No rash.   Neurological:      Mental Status: She is alert.       Assessment/Plan:   Assessment    1. Influenza A  - POCT Influenza A/B  - oseltamivir (TAMIFLU) 75 MG Cap; Take 1 Capsule by mouth 2 times a day.  Dispense: 10 Capsule; Refill: 0    2. Suspected COVID-19 virus infection  - SARS-CoV-2 PCR (24 hour In-House): Collect NP swab in [facility]; Future    3. Asthma without acute exacerbation  - albuterol 108 (90 Base) MCG/ACT Aero Soln inhalation aerosol; Inhale 2 Puffs every 6 hours as needed for Shortness of Breath.  Dispense: 8.5 g; Refill: 1    Patient with influenza A.  Will start Tamiflu.  Patient has history of asthma and did refill a butyryl inhaler since she has recently run out.  Reviewed other supportive care measures and will send COVID-19 PCR to rule out coinfection.  Follow-up as needed if not rapidly improving.

## 2022-04-24 NOTE — LETTER
April 24, 2022    To Whom It May Concern:         This is confirmation that Jade Saunders attended her scheduled appointment with Markus Aldridge M.D. on 4/24/22.  She is recovering from Influenza A.  She may not return to work until at least 4/27/22 if she is feeling up to it.  Thank you for making accommodations as she recovers.           If you have any questions please do not hesitate to call me at the phone number listed below.    Sincerely,          Markus Aldridge M.D.  [phone]

## 2022-04-25 LAB
SARS-COV-2 RNA RESP QL NAA+PROBE: NOTDETECTED
SPECIMEN SOURCE: NORMAL

## 2023-03-30 ENCOUNTER — OFFICE VISIT (OUTPATIENT)
Dept: URGENT CARE | Facility: CLINIC | Age: 23
End: 2023-03-30
Payer: COMMERCIAL

## 2023-03-30 VITALS
OXYGEN SATURATION: 98 % | DIASTOLIC BLOOD PRESSURE: 72 MMHG | BODY MASS INDEX: 41.47 KG/M2 | TEMPERATURE: 99 F | HEART RATE: 117 BPM | SYSTOLIC BLOOD PRESSURE: 126 MMHG | WEIGHT: 280 LBS | HEIGHT: 69 IN | RESPIRATION RATE: 18 BRPM

## 2023-03-30 DIAGNOSIS — R06.02 SOB (SHORTNESS OF BREATH): ICD-10-CM

## 2023-03-30 DIAGNOSIS — J01.90 ACUTE NON-RECURRENT SINUSITIS, UNSPECIFIED LOCATION: ICD-10-CM

## 2023-03-30 DIAGNOSIS — R05.1 ACUTE COUGH: ICD-10-CM

## 2023-03-30 PROCEDURE — 99213 OFFICE O/P EST LOW 20 MIN: CPT | Performed by: NURSE PRACTITIONER

## 2023-03-30 RX ORDER — BENZONATATE 200 MG/1
200 CAPSULE ORAL EVERY 8 HOURS PRN
Qty: 30 CAPSULE | Refills: 0 | Status: SHIPPED | OUTPATIENT
Start: 2023-03-30 | End: 2023-07-07

## 2023-03-30 RX ORDER — METHYLPREDNISOLONE 4 MG/1
TABLET ORAL
Qty: 1 EACH | Refills: 0 | Status: SHIPPED | OUTPATIENT
Start: 2023-03-30 | End: 2023-07-07

## 2023-03-30 RX ORDER — AMOXICILLIN AND CLAVULANATE POTASSIUM 875; 125 MG/1; MG/1
1 TABLET, FILM COATED ORAL 2 TIMES DAILY
Qty: 14 TABLET | Refills: 0 | Status: SHIPPED | OUTPATIENT
Start: 2023-03-30 | End: 2023-04-06

## 2023-03-30 RX ORDER — DEXTROMETHORPHAN HYDROBROMIDE AND PROMETHAZINE HYDROCHLORIDE 15; 6.25 MG/5ML; MG/5ML
5 SYRUP ORAL EVERY 4 HOURS PRN
Qty: 120 ML | Refills: 0 | Status: SHIPPED | OUTPATIENT
Start: 2023-03-30 | End: 2023-07-07

## 2023-03-30 ASSESSMENT — ENCOUNTER SYMPTOMS
SPUTUM PRODUCTION: 1
SINUS PAIN: 1
SHORTNESS OF BREATH: 1
COUGH: 1

## 2023-03-30 ASSESSMENT — VISUAL ACUITY: OU: 1

## 2023-03-30 NOTE — PROGRESS NOTES
Subjective:     Jade Saunders is a 22 y.o. female who presents for Cough (X2wks, cough, congestion, sob, unable to lay flat without coughing, unable to take a deep breath without coughing,sinus pressure, teeth achey, ears plugged, sore throat, left ear pain,  rattling and wheezing in lungs, colored mucus)       Cough  This is a new problem. The problem has been gradually worsening. Associated symptoms include ear pain and shortness of breath. She has tried a beta-agonist inhaler (DayQuil, NyQuil) for the symptoms. The treatment provided mild (Was helping, but not as much now) relief.   Sinusitis  This is a new problem. The problem has been gradually worsening since onset. Associated symptoms include congestion, coughing, ear pain and shortness of breath. Treatments tried: Nasal spray.     Review of Systems   Constitutional:  Positive for malaise/fatigue.   HENT:  Positive for congestion, ear pain and sinus pain.    Respiratory:  Positive for cough, sputum production and shortness of breath.    All other systems reviewed and are negative.    Refer to HPI for additional details.    During this visit, appropriate PPE was worn, hand hygiene was performed, and the patient and any visitors were masked.    PMH:  has a past medical history of Allergy, Anxiety, Depression, Migraine, and Polycystic ovarian syndrome.    MEDS:   Current Outpatient Medications:     Pseudoephedrine-APAP-DM (DAYQUIL PO), Take  by mouth., Disp: , Rfl:     amoxicillin-clavulanate (AUGMENTIN) 875-125 MG Tab, Take 1 Tablet by mouth 2 times a day for 7 days., Disp: 14 Tablet, Rfl: 0    methylPREDNISolone (MEDROL DOSEPAK) 4 MG Tablet Therapy Pack, Use as directed on package. May take all of Day 1 as a single dose (24 mg) when starting., Disp: 1 Each, Rfl: 0    benzonatate (TESSALON) 200 MG capsule, Take 1 Capsule by mouth every 8 hours as needed for Cough., Disp: 30 Capsule, Rfl: 0    promethazine-dextromethorphan (PROMETHAZINE-DM) 6.25-15 MG/5ML  "syrup, Take 5 mL by mouth every four hours as needed for Cough., Disp: 120 mL, Rfl: 0    albuterol 108 (90 Base) MCG/ACT Aero Soln inhalation aerosol, Inhale 2 Puffs every 6 hours as needed for Shortness of Breath., Disp: 8.5 g, Rfl: 1    ALLERGIES: No Known Allergies  SURGHX:   Past Surgical History:   Procedure Laterality Date    DENTAL EXTRACTION(S)       SOCHX:  reports that she has never smoked. She has never used smokeless tobacco. She reports that she does not drink alcohol and does not use drugs.    FH: Per HPI as applicable/pertinent.      Objective:     /72 (BP Location: Left arm, Patient Position: Sitting, BP Cuff Size: Large adult)   Pulse (!) 117   Temp 37.2 °C (99 °F) (Temporal)   Resp 18   Ht 1.753 m (5' 9\")   Wt (!) 127 kg (280 lb)   SpO2 98%   BMI 41.35 kg/m²     Physical Exam  Nursing note reviewed.   Constitutional:       General: She is not in acute distress.     Appearance: She is well-developed. She is not ill-appearing or toxic-appearing.   HENT:      Head: Normocephalic.      Right Ear: Tympanic membrane normal.      Left Ear: Tympanic membrane normal.      Nose: Nose normal.      Mouth/Throat:      Mouth: Mucous membranes are moist.      Pharynx: Uvula midline. No pharyngeal swelling, oropharyngeal exudate or posterior oropharyngeal erythema.      Comments: PND  Eyes:      General: Vision grossly intact.      Extraocular Movements: Extraocular movements intact.      Conjunctiva/sclera: Conjunctivae normal.   Neck:      Trachea: Phonation normal.   Cardiovascular:      Rate and Rhythm: Regular rhythm. Tachycardia present.      Heart sounds: Normal heart sounds.   Pulmonary:      Effort: Pulmonary effort is normal. No respiratory distress.      Breath sounds: Rhonchi present. No decreased breath sounds.   Musculoskeletal:         General: No deformity. Normal range of motion.      Cervical back: Normal range of motion.   Skin:     General: Skin is warm and dry.      Coloration: " Skin is not pale.   Neurological:      Mental Status: She is alert and oriented to person, place, and time.      Motor: No weakness.   Psychiatric:         Behavior: Behavior normal. Behavior is cooperative.       Assessment/Plan:     1. Acute cough  - benzonatate (TESSALON) 200 MG capsule; Take 1 Capsule by mouth every 8 hours as needed for Cough.  Dispense: 30 Capsule; Refill: 0  - promethazine-dextromethorphan (PROMETHAZINE-DM) 6.25-15 MG/5ML syrup; Take 5 mL by mouth every four hours as needed for Cough.  Dispense: 120 mL; Refill: 0    2. SOB (shortness of breath)  - methylPREDNISolone (MEDROL DOSEPAK) 4 MG Tablet Therapy Pack; Use as directed on package. May take all of Day 1 as a single dose (24 mg) when starting.  Dispense: 1 Each; Refill: 0    3. Acute non-recurrent sinusitis, unspecified location  - amoxicillin-clavulanate (AUGMENTIN) 875-125 MG Tab; Take 1 Tablet by mouth 2 times a day for 7 days.  Dispense: 14 Tablet; Refill: 0  - methylPREDNISolone (MEDROL DOSEPAK) 4 MG Tablet Therapy Pack; Use as directed on package. May take all of Day 1 as a single dose (24 mg) when starting.  Dispense: 1 Each; Refill: 0    Rx as above sent electronically. Caution drowsiness with syrup. Continue albuterol PRN.    Suggest using any combination of the following over-the-counter medications per 's instructions:  - sinus rinse kits, neti pot, nasal saline sprays  - nasal steroid sprays (e.g. fluticasone/Flonase, Nasacort)  - oral daily antihistamine (e.g. loratadine/Claritin, Zyrtec, Allegra)  - oral decongestant (e.g. pseudoephedrine, Sudafed)  - oral pain reliever/fever reducer (e.g. acetaminophen, Tylenol)  - avoid NSAIDs (e.g. aspirin, ibuprofen, naproxen) while on oral steroids    Differential diagnosis, natural history, supportive care, over-the-counter symptom management per 's instructions, close monitoring, and indications for immediate follow-up discussed.     All questions answered.  Patient agrees with the plan of care.    Discharge summary provided via [employer].

## 2023-03-30 NOTE — PATIENT INSTRUCTIONS
Suggest using any combination of the following over-the-counter medications per 's instructions:  - sinus rinse kits, neti pot, nasal saline sprays  - nasal steroid sprays (e.g. fluticasone/Flonase, Nasacort)  - oral daily antihistamine (e.g. loratadine/Claritin, Zyrtec, Allegra)  - oral decongestant (e.g. pseudoephedrine, Sudafed)  - oral pain reliever/fever reducer (e.g. acetaminophen, Tylenol)  - avoid NSAIDs (e.g. aspirin, ibuprofen, naproxen) while on oral steroids

## 2023-07-07 ENCOUNTER — OFFICE VISIT (OUTPATIENT)
Dept: MEDICAL GROUP | Facility: MEDICAL CENTER | Age: 23
End: 2023-07-07
Payer: COMMERCIAL

## 2023-07-07 VITALS
HEART RATE: 84 BPM | OXYGEN SATURATION: 96 % | BODY MASS INDEX: 42.95 KG/M2 | SYSTOLIC BLOOD PRESSURE: 124 MMHG | RESPIRATION RATE: 16 BRPM | WEIGHT: 290 LBS | TEMPERATURE: 98.5 F | DIASTOLIC BLOOD PRESSURE: 72 MMHG | HEIGHT: 69 IN

## 2023-07-07 DIAGNOSIS — G43.009 MIGRAINE WITHOUT AURA AND WITHOUT STATUS MIGRAINOSUS, NOT INTRACTABLE: ICD-10-CM

## 2023-07-07 DIAGNOSIS — F41.9 ANXIETY: ICD-10-CM

## 2023-07-07 DIAGNOSIS — Z00.00 PREVENTATIVE HEALTH CARE: ICD-10-CM

## 2023-07-07 DIAGNOSIS — H61.21 IMPACTED CERUMEN, RIGHT EAR: ICD-10-CM

## 2023-07-07 DIAGNOSIS — E28.2 PCOS (POLYCYSTIC OVARIAN SYNDROME): ICD-10-CM

## 2023-07-07 DIAGNOSIS — J30.1 SEASONAL ALLERGIC RHINITIS DUE TO POLLEN: ICD-10-CM

## 2023-07-07 DIAGNOSIS — E66.01 CLASS 3 SEVERE OBESITY WITHOUT SERIOUS COMORBIDITY WITH BODY MASS INDEX (BMI) OF 40.0 TO 44.9 IN ADULT, UNSPECIFIED OBESITY TYPE (HCC): ICD-10-CM

## 2023-07-07 DIAGNOSIS — Z11.59 NEED FOR HEPATITIS C SCREENING TEST: ICD-10-CM

## 2023-07-07 DIAGNOSIS — J45.909 ASTHMA DUE TO ENVIRONMENTAL ALLERGIES: ICD-10-CM

## 2023-07-07 DIAGNOSIS — Z23 NEED FOR VACCINATION: ICD-10-CM

## 2023-07-07 PROBLEM — F45.41 STRESS HEADACHES: Status: RESOLVED | Noted: 2019-04-24 | Resolved: 2023-07-07

## 2023-07-07 PROBLEM — E66.813 CLASS 3 SEVERE OBESITY WITHOUT SERIOUS COMORBIDITY WITH BODY MASS INDEX (BMI) OF 40.0 TO 44.9 IN ADULT (HCC): Status: ACTIVE | Noted: 2017-02-08

## 2023-07-07 PROBLEM — L73.9 FOLLICULITIS: Status: RESOLVED | Noted: 2018-04-11 | Resolved: 2023-07-07

## 2023-07-07 PROBLEM — F45.8 BRUXISM (TEETH GRINDING): Status: RESOLVED | Noted: 2019-04-24 | Resolved: 2023-07-07

## 2023-07-07 PROBLEM — F43.20 GRIEF REACTION: Status: RESOLVED | Noted: 2019-04-24 | Resolved: 2023-07-07

## 2023-07-07 PROBLEM — B37.31 YEAST VAGINITIS: Status: RESOLVED | Noted: 2018-04-11 | Resolved: 2023-07-07

## 2023-07-07 PROBLEM — N61.0 MASTITIS, RIGHT, ACUTE: Status: RESOLVED | Noted: 2019-08-15 | Resolved: 2023-07-07

## 2023-07-07 PROBLEM — Z86.69 HX OF MIGRAINES: Status: ACTIVE | Noted: 2023-07-07

## 2023-07-07 PROBLEM — F43.21 GRIEF REACTION: Status: RESOLVED | Noted: 2019-04-24 | Resolved: 2023-07-07

## 2023-07-07 PROCEDURE — 3078F DIAST BP <80 MM HG: CPT | Performed by: STUDENT IN AN ORGANIZED HEALTH CARE EDUCATION/TRAINING PROGRAM

## 2023-07-07 PROCEDURE — 99214 OFFICE O/P EST MOD 30 MIN: CPT | Mod: 25 | Performed by: STUDENT IN AN ORGANIZED HEALTH CARE EDUCATION/TRAINING PROGRAM

## 2023-07-07 PROCEDURE — 90472 IMMUNIZATION ADMIN EACH ADD: CPT | Performed by: STUDENT IN AN ORGANIZED HEALTH CARE EDUCATION/TRAINING PROGRAM

## 2023-07-07 PROCEDURE — 3074F SYST BP LT 130 MM HG: CPT | Performed by: STUDENT IN AN ORGANIZED HEALTH CARE EDUCATION/TRAINING PROGRAM

## 2023-07-07 PROCEDURE — 90471 IMMUNIZATION ADMIN: CPT | Performed by: STUDENT IN AN ORGANIZED HEALTH CARE EDUCATION/TRAINING PROGRAM

## 2023-07-07 PROCEDURE — 90677 PCV20 VACCINE IM: CPT | Performed by: STUDENT IN AN ORGANIZED HEALTH CARE EDUCATION/TRAINING PROGRAM

## 2023-07-07 PROCEDURE — 90651 9VHPV VACCINE 2/3 DOSE IM: CPT | Performed by: STUDENT IN AN ORGANIZED HEALTH CARE EDUCATION/TRAINING PROGRAM

## 2023-07-07 RX ORDER — FEXOFENADINE HCL 60 MG/1
60 TABLET, FILM COATED ORAL DAILY
COMMUNITY
End: 2024-02-23

## 2023-07-07 RX ORDER — ALBUTEROL SULFATE 90 UG/1
2 AEROSOL, METERED RESPIRATORY (INHALATION) EVERY 6 HOURS PRN
COMMUNITY
End: 2023-07-07

## 2023-07-07 SDOH — ECONOMIC STABILITY: FOOD INSECURITY: WITHIN THE PAST 12 MONTHS, THE FOOD YOU BOUGHT JUST DIDN'T LAST AND YOU DIDN'T HAVE MONEY TO GET MORE.: NEVER TRUE

## 2023-07-07 SDOH — ECONOMIC STABILITY: HOUSING INSECURITY: IN THE LAST 12 MONTHS, HOW MANY PLACES HAVE YOU LIVED?: 1

## 2023-07-07 SDOH — ECONOMIC STABILITY: HOUSING INSECURITY
IN THE LAST 12 MONTHS, WAS THERE A TIME WHEN YOU DID NOT HAVE A STEADY PLACE TO SLEEP OR SLEPT IN A SHELTER (INCLUDING NOW)?: PATIENT REFUSED

## 2023-07-07 SDOH — ECONOMIC STABILITY: FOOD INSECURITY: WITHIN THE PAST 12 MONTHS, YOU WORRIED THAT YOUR FOOD WOULD RUN OUT BEFORE YOU GOT MONEY TO BUY MORE.: NEVER TRUE

## 2023-07-07 SDOH — ECONOMIC STABILITY: HOUSING INSECURITY
IN THE LAST 12 MONTHS, WAS THERE A TIME WHEN YOU DID NOT HAVE A STEADY PLACE TO SLEEP OR SLEPT IN A SHELTER (INCLUDING NOW)?: NO

## 2023-07-07 SDOH — ECONOMIC STABILITY: TRANSPORTATION INSECURITY
IN THE PAST 12 MONTHS, HAS THE LACK OF TRANSPORTATION KEPT YOU FROM MEDICAL APPOINTMENTS OR FROM GETTING MEDICATIONS?: NO

## 2023-07-07 SDOH — HEALTH STABILITY: PHYSICAL HEALTH: ON AVERAGE, HOW MANY DAYS PER WEEK DO YOU ENGAGE IN MODERATE TO STRENUOUS EXERCISE (LIKE A BRISK WALK)?: 1 DAY

## 2023-07-07 SDOH — HEALTH STABILITY: PHYSICAL HEALTH: ON AVERAGE, HOW MANY MINUTES DO YOU ENGAGE IN EXERCISE AT THIS LEVEL?: 10 MIN

## 2023-07-07 SDOH — ECONOMIC STABILITY: INCOME INSECURITY: HOW HARD IS IT FOR YOU TO PAY FOR THE VERY BASICS LIKE FOOD, HOUSING, MEDICAL CARE, AND HEATING?: PATIENT DECLINED

## 2023-07-07 SDOH — ECONOMIC STABILITY: TRANSPORTATION INSECURITY
IN THE PAST 12 MONTHS, HAS LACK OF TRANSPORTATION KEPT YOU FROM MEETINGS, WORK, OR FROM GETTING THINGS NEEDED FOR DAILY LIVING?: NO

## 2023-07-07 SDOH — ECONOMIC STABILITY: INCOME INSECURITY: IN THE LAST 12 MONTHS, WAS THERE A TIME WHEN YOU WERE NOT ABLE TO PAY THE MORTGAGE OR RENT ON TIME?: PATIENT REFUSED

## 2023-07-07 SDOH — ECONOMIC STABILITY: TRANSPORTATION INSECURITY
IN THE PAST 12 MONTHS, HAS LACK OF RELIABLE TRANSPORTATION KEPT YOU FROM MEDICAL APPOINTMENTS, MEETINGS, WORK OR FROM GETTING THINGS NEEDED FOR DAILY LIVING?: NO

## 2023-07-07 SDOH — HEALTH STABILITY: MENTAL HEALTH
STRESS IS WHEN SOMEONE FEELS TENSE, NERVOUS, ANXIOUS, OR CAN'T SLEEP AT NIGHT BECAUSE THEIR MIND IS TROUBLED. HOW STRESSED ARE YOU?: TO SOME EXTENT

## 2023-07-07 ASSESSMENT — ENCOUNTER SYMPTOMS
NAUSEA: 0
FEVER: 0
SHORTNESS OF BREATH: 0
WHEEZING: 0
CHILLS: 0
COUGH: 1
VOMITING: 0
ABDOMINAL PAIN: 0
DIARRHEA: 0

## 2023-07-07 ASSESSMENT — LIFESTYLE VARIABLES
HOW OFTEN DO YOU HAVE A DRINK CONTAINING ALCOHOL: NEVER
AUDIT-C TOTAL SCORE: 0
HOW OFTEN DO YOU HAVE SIX OR MORE DRINKS ON ONE OCCASION: NEVER
SKIP TO QUESTIONS 9-10: 1
HOW MANY STANDARD DRINKS CONTAINING ALCOHOL DO YOU HAVE ON A TYPICAL DAY: PATIENT DOES NOT DRINK

## 2023-07-07 ASSESSMENT — SOCIAL DETERMINANTS OF HEALTH (SDOH)
IN A TYPICAL WEEK, HOW MANY TIMES DO YOU TALK ON THE PHONE WITH FAMILY, FRIENDS, OR NEIGHBORS?: MORE THAN THREE TIMES A WEEK
HOW MANY DRINKS CONTAINING ALCOHOL DO YOU HAVE ON A TYPICAL DAY WHEN YOU ARE DRINKING: PATIENT DOES NOT DRINK
DO YOU BELONG TO ANY CLUBS OR ORGANIZATIONS SUCH AS CHURCH GROUPS UNIONS, FRATERNAL OR ATHLETIC GROUPS, OR SCHOOL GROUPS?: YES
HOW OFTEN DO YOU ATTENT MEETINGS OF THE CLUB OR ORGANIZATION YOU BELONG TO?: MORE THAN 4 TIMES PER YEAR
HOW OFTEN DO YOU ATTEND CHURCH OR RELIGIOUS SERVICES?: MORE THAN 4 TIMES PER YEAR
HOW OFTEN DO YOU HAVE A DRINK CONTAINING ALCOHOL: NEVER
ARE YOU MARRIED, WIDOWED, DIVORCED, SEPARATED, NEVER MARRIED, OR LIVING WITH A PARTNER?: NEVER MARRIED
ARE YOU MARRIED, WIDOWED, DIVORCED, SEPARATED, NEVER MARRIED, OR LIVING WITH A PARTNER?: NEVER MARRIED
HOW OFTEN DO YOU ATTEND CHURCH OR RELIGIOUS SERVICES?: MORE THAN 4 TIMES PER YEAR
DO YOU BELONG TO ANY CLUBS OR ORGANIZATIONS SUCH AS CHURCH GROUPS UNIONS, FRATERNAL OR ATHLETIC GROUPS, OR SCHOOL GROUPS?: YES
HOW OFTEN DO YOU ATTENT MEETINGS OF THE CLUB OR ORGANIZATION YOU BELONG TO?: MORE THAN 4 TIMES PER YEAR
WITHIN THE PAST 12 MONTHS, YOU WORRIED THAT YOUR FOOD WOULD RUN OUT BEFORE YOU GOT THE MONEY TO BUY MORE: NEVER TRUE
HOW OFTEN DO YOU GET TOGETHER WITH FRIENDS OR RELATIVES?: TWICE A WEEK
HOW OFTEN DO YOU GET TOGETHER WITH FRIENDS OR RELATIVES?: TWICE A WEEK
IN A TYPICAL WEEK, HOW MANY TIMES DO YOU TALK ON THE PHONE WITH FAMILY, FRIENDS, OR NEIGHBORS?: MORE THAN THREE TIMES A WEEK
HOW OFTEN DO YOU HAVE SIX OR MORE DRINKS ON ONE OCCASION: NEVER
HOW HARD IS IT FOR YOU TO PAY FOR THE VERY BASICS LIKE FOOD, HOUSING, MEDICAL CARE, AND HEATING?: PATIENT DECLINED

## 2023-07-07 ASSESSMENT — ANXIETY QUESTIONNAIRES
2. NOT BEING ABLE TO STOP OR CONTROL WORRYING: SEVERAL DAYS
5. BEING SO RESTLESS THAT IT IS HARD TO SIT STILL: NOT AT ALL
GAD7 TOTAL SCORE: 4
1. FEELING NERVOUS, ANXIOUS, OR ON EDGE: SEVERAL DAYS
3. WORRYING TOO MUCH ABOUT DIFFERENT THINGS: SEVERAL DAYS
7. FEELING AFRAID AS IF SOMETHING AWFUL MIGHT HAPPEN: NOT AT ALL
6. BECOMING EASILY ANNOYED OR IRRITABLE: SEVERAL DAYS
4. TROUBLE RELAXING: NOT AT ALL

## 2023-07-07 ASSESSMENT — PATIENT HEALTH QUESTIONNAIRE - PHQ9: CLINICAL INTERPRETATION OF PHQ2 SCORE: 0

## 2023-07-07 NOTE — PROGRESS NOTES
Subjective:     HISTORY OF THE PRESENT ILLNESS: Patient is a 23 y.o. female. This pleasant patient is here today to establish care and discuss . Prior PCP was Otis Mercado.    Problem   Migraine Without Aura and Without Status Migrainosus, Not Intractable    Chronic, intermittent problem.  Patient reports migraine headaches lasting less than a day typically.  Her headaches typically resolve with Excedrin.  Currently has headaches once or twice per month.  She does grind her teeth at night and has noticed improvement in her headaches since she started using a mouthguard.     Impacted Cerumen, Right Ear    Overall, this does not bother the patient, but she sometimes notices that her hearing ear will become plugged up and her hearing is affected and then.     Asthma Due to Environmental Allergies    Related to her allergies.  Occasionally has some chest tightness and cough in the mornings.  She has albuterol inhaler.  She only needs to use this about 3 times per month.       Pcos (Polycystic Ovarian Syndrome)    This is a chronic problem.  Patient was diagnosed 10 years old.  She was put on a OCP when she was diagnosed.  She was on this for several years but eventually took herself off because she did not like the side effects.  Currently her menses are regular monthly.  They last for about 5 days.  Patient does have some heavy bleeding for the first 3 days of her.  And reports mild to moderate cramping in the days leading up to her.  No severe symptoms at this time.     Anxiety    This is a chronic problem.  Patient has never needed medication for this.  Previously saw a psychologist.  Symptoms have been well controlled without intervention for about a year.       Class 3 Severe Obesity Without Serious Comorbidity With Body Mass Index (Bmi) of 40.0 to 44.9 in Adult (Hcc)    Current weight: 290 lbs  BMI: 42.83  Diet: Generally balanced. Avoids red meat, chooses salmon or chicken for protein. Trying to cut back  "on carbs.  Exercise: Patient does not exercise and has a sedentary job.     Seasonal Allergic Rhinitis Due to Pollen    Environmental allergies.  Controlled with daily antihistamine.         Past medical, surgical, family, and social history were reviewed and updated in Epic chart by me today. Medications and allergies were reviewed and updated in Epic chart by me today.       Health Maintenance: Completed      ROS:   Review of Systems   Constitutional:  Negative for chills and fever.   HENT:  Positive for congestion.    Respiratory:  Positive for cough. Negative for shortness of breath and wheezing.    Cardiovascular:  Negative for chest pain and leg swelling.   Gastrointestinal:  Negative for abdominal pain, diarrhea, nausea and vomiting.   Genitourinary:  Negative for dysuria.         Objective:     Exam: /72 (BP Location: Right arm, Patient Position: Sitting, BP Cuff Size: Adult long)   Pulse 84   Temp 36.9 °C (98.5 °F) (Temporal)   Resp 16   Ht 1.753 m (5' 9\")   Wt (!) 132 kg (290 lb)   SpO2 96%  Body mass index is 42.83 kg/m².    Physical Exam  Constitutional:       General: She is not in acute distress.  HENT:      Right Ear: Ear canal and external ear normal. There is impacted cerumen.      Left Ear: Tympanic membrane, ear canal and external ear normal.      Mouth/Throat:      Mouth: Mucous membranes are moist.      Pharynx: Oropharynx is clear. No oropharyngeal exudate or posterior oropharyngeal erythema.   Eyes:      Extraocular Movements: Extraocular movements intact.      Conjunctiva/sclera: Conjunctivae normal.      Pupils: Pupils are equal, round, and reactive to light.   Cardiovascular:      Rate and Rhythm: Normal rate and regular rhythm.      Heart sounds: No murmur heard.     No friction rub. No gallop.   Pulmonary:      Effort: Pulmonary effort is normal.      Breath sounds: No wheezing, rhonchi or rales.   Abdominal:      General: There is no distension.   Musculoskeletal:         " General: No swelling.      Cervical back: Normal range of motion and neck supple.   Lymphadenopathy:      Cervical: No cervical adenopathy.   Skin:     General: Skin is warm and dry.   Neurological:      General: No focal deficit present.      Mental Status: She is alert and oriented to person, place, and time.   Psychiatric:         Mood and Affect: Mood normal.         Labs: Most recent labs 04/24/2019 were reviewed.    Assessment & Plan:   23 y.o. female with the following -    1. PCOS (polycystic ovarian syndrome)  Chronic, controlled.  Not currently on any medications.  Menses are normal.  No treatment is necessary at this time.    2. Asthma due to environmental allergies  3. Seasonal allergic rhinitis due to pollen  Chronic, stable.  Continue Allegra 60 mg 1-2 times daily and albuterol inhaler as needed.    4. Class 3 severe obesity without serious comorbidity with body mass index (BMI) of 40.0 to 44.9 in adult, unspecified obesity type (HCC)  Chronic, patient has gained weight since last visit.  Discussed dietary modifications and encouraged patient to start exercising 3-5 times per week.  She intends to start walking and doing light weight training workouts.    5. Migraine without aura and without status migrainosus, not intractable  Chronic, stable.  Currently experiences headaches once or twice per month.  They are not debilitating and respond to Excedrin.    6. Anxiety  Chronic, controlled.  Patient is not currently on any medication.  DU-7 score today is 4 indicating minimal anxiety.    7. Impacted cerumen, right ear  Acute problem.  Patient has impacted cerumen of the right ear which sometimes affects her hearing and is sometimes uncomfortable.  The ear was irrigated successfully in clinic today.    8. Need for hepatitis C screening test  - HEP C VIRUS ANTIBODY; Future    9. Need for vaccination  - 9VHPV Vaccine 2-3 Dose (GARDASIL 9)  - Pneumococcal Conjugate Vaccine 20-Valent (19 yrs+)    10.  Preventative health care  Patient is due for Pap smear.  She has not had a Pap yet.  She is not interested in scheduling this at this time.  She has not been sexually active and her Gardasil series was completed today.  I have explained the guidelines to her and recommended that she consider starting cervical cancer screening in the future.  - Basic Metabolic Panel; Future  - CBC WITH DIFFERENTIAL; Future  - Lipid Profile; Future          Return in about 6 months (around 1/7/2024) for follow up.    Please note that this dictation was created using voice recognition software. I have made every reasonable attempt to correct obvious errors, but I expect that there are errors of grammar and possibly content that I did not discover before finalizing the note.

## 2023-08-04 ENCOUNTER — HOSPITAL ENCOUNTER (OUTPATIENT)
Dept: LAB | Facility: MEDICAL CENTER | Age: 23
End: 2023-08-04
Attending: STUDENT IN AN ORGANIZED HEALTH CARE EDUCATION/TRAINING PROGRAM
Payer: COMMERCIAL

## 2023-08-04 DIAGNOSIS — Z00.00 PREVENTATIVE HEALTH CARE: ICD-10-CM

## 2023-08-04 DIAGNOSIS — Z11.59 NEED FOR HEPATITIS C SCREENING TEST: ICD-10-CM

## 2023-08-04 LAB
ANION GAP SERPL CALC-SCNC: 12 MMOL/L (ref 7–16)
BASOPHILS # BLD AUTO: 0.3 % (ref 0–1.8)
BASOPHILS # BLD: 0.03 K/UL (ref 0–0.12)
BUN SERPL-MCNC: 11 MG/DL (ref 8–22)
CALCIUM SERPL-MCNC: 10.2 MG/DL (ref 8.5–10.5)
CHLORIDE SERPL-SCNC: 103 MMOL/L (ref 96–112)
CHOLEST SERPL-MCNC: 228 MG/DL (ref 100–199)
CO2 SERPL-SCNC: 24 MMOL/L (ref 20–33)
CREAT SERPL-MCNC: 0.59 MG/DL (ref 0.5–1.4)
EOSINOPHIL # BLD AUTO: 0.1 K/UL (ref 0–0.51)
EOSINOPHIL NFR BLD: 1.1 % (ref 0–6.9)
ERYTHROCYTE [DISTWIDTH] IN BLOOD BY AUTOMATED COUNT: 41.1 FL (ref 35.9–50)
FASTING STATUS PATIENT QL REPORTED: NORMAL
GFR SERPLBLD CREATININE-BSD FMLA CKD-EPI: 128 ML/MIN/1.73 M 2
GLUCOSE SERPL-MCNC: 84 MG/DL (ref 65–99)
HCT VFR BLD AUTO: 52.1 % (ref 37–47)
HCV AB SER QL: NORMAL
HDLC SERPL-MCNC: 44 MG/DL
HGB BLD-MCNC: 16.7 G/DL (ref 12–16)
IMM GRANULOCYTES # BLD AUTO: 0.03 K/UL (ref 0–0.11)
IMM GRANULOCYTES NFR BLD AUTO: 0.3 % (ref 0–0.9)
LDLC SERPL CALC-MCNC: 157 MG/DL
LYMPHOCYTES # BLD AUTO: 2.64 K/UL (ref 1–4.8)
LYMPHOCYTES NFR BLD: 30.3 % (ref 22–41)
MCH RBC QN AUTO: 28 PG (ref 27–33)
MCHC RBC AUTO-ENTMCNC: 32.1 G/DL (ref 32.2–35.5)
MCV RBC AUTO: 87.4 FL (ref 81.4–97.8)
MONOCYTES # BLD AUTO: 0.72 K/UL (ref 0–0.85)
MONOCYTES NFR BLD AUTO: 8.3 % (ref 0–13.4)
NEUTROPHILS # BLD AUTO: 5.19 K/UL (ref 1.82–7.42)
NEUTROPHILS NFR BLD: 59.7 % (ref 44–72)
NRBC # BLD AUTO: 0 K/UL
NRBC BLD-RTO: 0 /100 WBC (ref 0–0.2)
PLATELET # BLD AUTO: 337 K/UL (ref 164–446)
PMV BLD AUTO: 9.4 FL (ref 9–12.9)
POTASSIUM SERPL-SCNC: 4.5 MMOL/L (ref 3.6–5.5)
RBC # BLD AUTO: 5.96 M/UL (ref 4.2–5.4)
SODIUM SERPL-SCNC: 139 MMOL/L (ref 135–145)
TRIGL SERPL-MCNC: 137 MG/DL (ref 0–149)
WBC # BLD AUTO: 8.7 K/UL (ref 4.8–10.8)

## 2023-08-04 PROCEDURE — 85025 COMPLETE CBC W/AUTO DIFF WBC: CPT

## 2023-08-04 PROCEDURE — 80061 LIPID PANEL: CPT

## 2023-08-04 PROCEDURE — 36415 COLL VENOUS BLD VENIPUNCTURE: CPT

## 2023-08-04 PROCEDURE — 80048 BASIC METABOLIC PNL TOTAL CA: CPT

## 2023-08-04 PROCEDURE — 86803 HEPATITIS C AB TEST: CPT

## 2023-09-18 ENCOUNTER — OFFICE VISIT (OUTPATIENT)
Dept: MEDICAL GROUP | Facility: PHYSICIAN GROUP | Age: 23
End: 2023-09-18
Payer: COMMERCIAL

## 2023-09-18 VITALS
WEIGHT: 286 LBS | HEIGHT: 69 IN | DIASTOLIC BLOOD PRESSURE: 68 MMHG | BODY MASS INDEX: 42.36 KG/M2 | OXYGEN SATURATION: 94 % | HEART RATE: 99 BPM | SYSTOLIC BLOOD PRESSURE: 122 MMHG | TEMPERATURE: 98.2 F

## 2023-09-18 DIAGNOSIS — J06.9 VIRAL URI WITH COUGH: ICD-10-CM

## 2023-09-18 PROCEDURE — 3074F SYST BP LT 130 MM HG: CPT | Performed by: STUDENT IN AN ORGANIZED HEALTH CARE EDUCATION/TRAINING PROGRAM

## 2023-09-18 PROCEDURE — 3078F DIAST BP <80 MM HG: CPT | Performed by: STUDENT IN AN ORGANIZED HEALTH CARE EDUCATION/TRAINING PROGRAM

## 2023-09-18 PROCEDURE — 99213 OFFICE O/P EST LOW 20 MIN: CPT | Performed by: STUDENT IN AN ORGANIZED HEALTH CARE EDUCATION/TRAINING PROGRAM

## 2023-09-18 RX ORDER — BENZONATATE 100 MG/1
100 CAPSULE ORAL 3 TIMES DAILY PRN
Qty: 60 CAPSULE | Refills: 0 | Status: SHIPPED | OUTPATIENT
Start: 2023-09-18 | End: 2024-02-23

## 2023-09-18 NOTE — PATIENT INSTRUCTIONS
For sore throat and painful teeth: Ibuprofen 800 mg three time daily as needed.    For congestion: trial Allegra-D (generic OK).

## 2023-09-19 NOTE — PROGRESS NOTES
Subjective:     CC:  The encounter diagnosis was Viral URI with cough.    HISTORY OF THE PRESENT ILLNESS: Patient is a 23 y.o. female. This pleasant patient is here today to discuss:    1. Viral URI with cough  Status symptoms this past Saturday.    Patient endorses painful teeth, facial pressure, ear and nose congestion, dry cough, pharyngitis.    She denies NVD, rash, fever, loss of sense of taste or smell, difficulty with eating or drinking, exposure to someone with similar symptoms, headache.    She has attempted no treatment.    Active Diagnosis:    Patient Active Problem List   Diagnosis    Family history of early CAD    Anxiety    Class 3 severe obesity without serious comorbidity with body mass index (BMI) of 40.0 to 44.9 in adult (HCC)    Seasonal allergic rhinitis due to pollen    PCOS (polycystic ovarian syndrome)    Excessive daytime sleepiness    Snoring    Asthma due to environmental allergies    Lump of right breast    Migraine without aura and without status migrainosus, not intractable    Impacted cerumen, right ear      Current Outpatient Medications Ordered in Epic   Medication Sig Dispense Refill    benzonatate (TESSALON) 100 MG Cap Take 1 Capsule by mouth 3 times a day as needed for Cough. 60 Capsule 0    fexofenadine (ALLEGRA) 60 MG Tab Take 60 mg by mouth every day.      albuterol 108 (90 Base) MCG/ACT Aero Soln inhalation aerosol Inhale 2 Puffs every 6 hours as needed for Shortness of Breath. 8.5 g 1     No current [facility]-ordered facility-administered medications on file.     ROS:   Gen: No fevers/chills, no changes in weight  HEENT: No changes in vision/hearing, sore throat.  Pulm: No cough, unexplained SOB.  CV: No chest pain/pressure, no palpitations  GI: No nausea/vomiting, no diarrhea  : No dysuria/nocturia  MSk: No myalgias  Skin: No rash/skin changes  Neuro: No headaches, no numbness/tingling  Heme/Lymph: no easy bruising      Objective:     Exam: /68 (BP Location: Right arm,  "Patient Position: Sitting, BP Cuff Size: Adult)   Pulse 99   Temp 36.8 °C (98.2 °F) (Temporal)   Ht 1.753 m (5' 9\")   Wt (!) 130 kg (286 lb)   SpO2 94%  Body mass index is 42.23 kg/m².    General: Normal appearing. No distress.  HEENT: Normocephalic. Eyes conjunctiva clear lids without ptosis. Pupils equal and reactive to light accommodation. Ears normal shape and contour. Canals are clear bilaterally, tympanic membranes are benign. Nasal mucosa benign, oropharynx is without erythema, edema or exudates.  Tonsils +1.  Neck: Supple without JVD. Thyroid is not enlarged.  Pulmonary: Clear to ausculation.  Normal effort. No rales, ronchi, or wheezing.  neurologic: Grossly nonfocal.  CN II through XII intact.  Lymph: No cervical or supraclavicular lymph nodes are palpable  Skin: Warm and dry.  No obvious lesions.  Musculoskeletal: Normal gait. No extremity cyanosis, clubbing, or edema.        Assessment & Plan:   23 y.o. female with the following -    Labs:   8/4/2023:  -BMP, WNL.    1. Viral URI with cough  -Acute, uncomplicated illness.  -Allegra-D per package instructions for the next 3 to 5 days.  OTC.  -Okay for ibuprofen 800 mg 3 times daily as needed for painful teeth/facial pressure.  OTC.  - benzonatate (TESSALON) 100 MG Cap; Take 1 Capsule by mouth 3 times a day as needed for Cough.  Dispense: 60 Capsule; Refill: 0    Return if symptoms worsen or fail to improve.  Within 14 days.    Please note that this dictation was created using voice recognition software. I have made every reasonable attempt to correct obvious errors, but I expect that there are errors of grammar and possibly content that I did not discover before finalizing the note.      Charles Hendrickson PA-C 9/18/2023  "

## 2024-02-02 ENCOUNTER — APPOINTMENT (OUTPATIENT)
Dept: MEDICAL GROUP | Facility: PHYSICIAN GROUP | Age: 24
End: 2024-02-02
Payer: COMMERCIAL

## 2024-02-23 ENCOUNTER — OFFICE VISIT (OUTPATIENT)
Dept: MEDICAL GROUP | Facility: PHYSICIAN GROUP | Age: 24
End: 2024-02-23
Payer: COMMERCIAL

## 2024-02-23 VITALS
BODY MASS INDEX: 42.92 KG/M2 | HEART RATE: 83 BPM | OXYGEN SATURATION: 97 % | WEIGHT: 289.8 LBS | HEIGHT: 69 IN | SYSTOLIC BLOOD PRESSURE: 126 MMHG | DIASTOLIC BLOOD PRESSURE: 74 MMHG | TEMPERATURE: 98.2 F

## 2024-02-23 DIAGNOSIS — E78.5 DYSLIPIDEMIA: ICD-10-CM

## 2024-02-23 DIAGNOSIS — E66.01 CLASS 3 SEVERE OBESITY WITHOUT SERIOUS COMORBIDITY WITH BODY MASS INDEX (BMI) OF 40.0 TO 44.9 IN ADULT, UNSPECIFIED OBESITY TYPE (HCC): ICD-10-CM

## 2024-02-23 DIAGNOSIS — Z00.00 PREVENTATIVE HEALTH CARE: ICD-10-CM

## 2024-02-23 PROCEDURE — 3074F SYST BP LT 130 MM HG: CPT | Performed by: STUDENT IN AN ORGANIZED HEALTH CARE EDUCATION/TRAINING PROGRAM

## 2024-02-23 PROCEDURE — 99214 OFFICE O/P EST MOD 30 MIN: CPT | Performed by: STUDENT IN AN ORGANIZED HEALTH CARE EDUCATION/TRAINING PROGRAM

## 2024-02-23 PROCEDURE — 3078F DIAST BP <80 MM HG: CPT | Performed by: STUDENT IN AN ORGANIZED HEALTH CARE EDUCATION/TRAINING PROGRAM

## 2024-02-23 ASSESSMENT — ENCOUNTER SYMPTOMS
DIZZINESS: 0
HEADACHES: 0
FEVER: 0
SHORTNESS OF BREATH: 0
CHILLS: 0

## 2024-02-23 ASSESSMENT — PATIENT HEALTH QUESTIONNAIRE - PHQ9: CLINICAL INTERPRETATION OF PHQ2 SCORE: 0

## 2024-02-23 NOTE — PROGRESS NOTES
"Subjective:     CC:   Chief Complaint   Patient presents with    Follow-Up    Requesting Labs       HPI:   Jade presents today with    Problem   Dyslipidemia    Chronic in nature.    Lab Results   Component Value Date/Time    CHOLSTRLTOT 228 (H) 08/04/2023 0956    TRIGLYCERIDE 137 08/04/2023 0956    HDL 44 08/04/2023 0956     (H) 08/04/2023 0956        Class 3 Severe Obesity Without Serious Comorbidity With Body Mass Index (Bmi) of 40.0 to 44.9 in Adult (Hcc)    Current weight: 289 lbs  BMI: 42.80  Diet: Generally balanced. Avoids red meat, chooses salmon or chicken for protein. Trying to cut back on carbs. Limits snacks.  Exercise: Walking 30 minutes every day.  Does have a history of PCOS which could be contributing.  She is trying to lose weight.  Is not interested in medication management at this time.           Past medical, family, and social history reviewed by me in Epic chart today.   Medications and allergies reviewed in Epic chart by me today.       Health Maintenance: Completed    ROS:  Review of Systems   Constitutional:  Negative for chills and fever.   Respiratory:  Negative for shortness of breath.    Cardiovascular:  Negative for chest pain.   Neurological:  Negative for dizziness and headaches.       Objective:     Exam:  /74 (BP Location: Left arm, Patient Position: Sitting, BP Cuff Size: Adult long)   Pulse 83   Temp 36.8 °C (98.2 °F) (Temporal)   Ht 1.753 m (5' 9\")   Wt (!) 131 kg (289 lb 12.8 oz)   SpO2 97%   BMI 42.80 kg/m²  Body mass index is 42.8 kg/m².    Physical Exam  Vitals reviewed.   Constitutional:       General: She is not in acute distress.  Eyes:      Extraocular Movements: Extraocular movements intact.   Cardiovascular:      Rate and Rhythm: Normal rate and regular rhythm.      Heart sounds: No murmur heard.     No friction rub. No gallop.   Pulmonary:      Effort: Pulmonary effort is normal.      Breath sounds: No wheezing, rhonchi or rales. "   Musculoskeletal:      Cervical back: Neck supple.   Skin:     General: Skin is warm and dry.   Neurological:      Mental Status: She is alert.   Psychiatric:         Mood and Affect: Mood normal.           Assessment & Plan:     23 y.o. female with the following -     1. Class 3 severe obesity without serious comorbidity with body mass index (BMI) of 40.0 to 44.9 in adult, unspecified obesity type (HCC)  Chronic, stable.  Continue healthy diet and regular physical activity.  We have discussed medication options for treatment of obesity, but the patient is not interested at this time.  She will continue working on lifestyle modifications.    2. Dyslipidemia  Chronic, uncontrolled.  Will continue to monitor.  Encouraged heart healthy diet, such as the Mediterranean diet, and regular physical activity.  - Lipid Profile; Future    3. Preventative health care  - Basic Metabolic Panel; Future  - HEMOGLOBIN A1C; Future          Return in about 1 year (around 2/23/2025) for annual.    Please note that this dictation was created using voice recognition software. I have made every reasonable attempt to correct obvious errors, but I expect that there are errors of grammar and possibly content that I did not discover before finalizing the note.

## 2025-03-04 SDOH — ECONOMIC STABILITY: INCOME INSECURITY: IN THE LAST 12 MONTHS, WAS THERE A TIME WHEN YOU WERE NOT ABLE TO PAY THE MORTGAGE OR RENT ON TIME?: PATIENT DECLINED

## 2025-03-04 SDOH — HEALTH STABILITY: MENTAL HEALTH
STRESS IS WHEN SOMEONE FEELS TENSE, NERVOUS, ANXIOUS, OR CAN'T SLEEP AT NIGHT BECAUSE THEIR MIND IS TROUBLED. HOW STRESSED ARE YOU?: ONLY A LITTLE

## 2025-03-04 SDOH — ECONOMIC STABILITY: HOUSING INSECURITY
IN THE LAST 12 MONTHS, WAS THERE A TIME WHEN YOU DID NOT HAVE A STEADY PLACE TO SLEEP OR SLEPT IN A SHELTER (INCLUDING NOW)?: PATIENT DECLINED

## 2025-03-04 SDOH — HEALTH STABILITY: PHYSICAL HEALTH: ON AVERAGE, HOW MANY MINUTES DO YOU ENGAGE IN EXERCISE AT THIS LEVEL?: 30 MIN

## 2025-03-04 SDOH — HEALTH STABILITY: PHYSICAL HEALTH: ON AVERAGE, HOW MANY DAYS PER WEEK DO YOU ENGAGE IN MODERATE TO STRENUOUS EXERCISE (LIKE A BRISK WALK)?: 2 DAYS

## 2025-03-04 SDOH — ECONOMIC STABILITY: FOOD INSECURITY: WITHIN THE PAST 12 MONTHS, THE FOOD YOU BOUGHT JUST DIDN'T LAST AND YOU DIDN'T HAVE MONEY TO GET MORE.: PATIENT DECLINED

## 2025-03-04 SDOH — ECONOMIC STABILITY: INCOME INSECURITY: HOW HARD IS IT FOR YOU TO PAY FOR THE VERY BASICS LIKE FOOD, HOUSING, MEDICAL CARE, AND HEATING?: PATIENT DECLINED

## 2025-03-04 SDOH — ECONOMIC STABILITY: FOOD INSECURITY: WITHIN THE PAST 12 MONTHS, YOU WORRIED THAT YOUR FOOD WOULD RUN OUT BEFORE YOU GOT MONEY TO BUY MORE.: PATIENT DECLINED

## 2025-03-04 ASSESSMENT — SOCIAL DETERMINANTS OF HEALTH (SDOH)
HOW OFTEN DO YOU ATTENT MEETINGS OF THE CLUB OR ORGANIZATION YOU BELONG TO?: MORE THAN 4 TIMES PER YEAR
ARE YOU MARRIED, WIDOWED, DIVORCED, SEPARATED, NEVER MARRIED, OR LIVING WITH A PARTNER?: NEVER MARRIED
HOW OFTEN DO YOU GET TOGETHER WITH FRIENDS OR RELATIVES?: MORE THAN THREE TIMES A WEEK
HOW OFTEN DO YOU ATTEND CHURCH OR RELIGIOUS SERVICES?: PATIENT DECLINED
DO YOU BELONG TO ANY CLUBS OR ORGANIZATIONS SUCH AS CHURCH GROUPS UNIONS, FRATERNAL OR ATHLETIC GROUPS, OR SCHOOL GROUPS?: YES
HOW OFTEN DO YOU GET TOGETHER WITH FRIENDS OR RELATIVES?: MORE THAN THREE TIMES A WEEK
IN THE PAST 12 MONTHS, HAS THE ELECTRIC, GAS, OIL, OR WATER COMPANY THREATENED TO SHUT OFF SERVICE IN YOUR HOME?: PATIENT DECLINED
HOW MANY DRINKS CONTAINING ALCOHOL DO YOU HAVE ON A TYPICAL DAY WHEN YOU ARE DRINKING: PATIENT DOES NOT DRINK
HOW OFTEN DO YOU HAVE A DRINK CONTAINING ALCOHOL: NEVER
HOW OFTEN DO YOU HAVE SIX OR MORE DRINKS ON ONE OCCASION: NEVER
ARE YOU MARRIED, WIDOWED, DIVORCED, SEPARATED, NEVER MARRIED, OR LIVING WITH A PARTNER?: NEVER MARRIED
HOW OFTEN DO YOU ATTENT MEETINGS OF THE CLUB OR ORGANIZATION YOU BELONG TO?: MORE THAN 4 TIMES PER YEAR
WITHIN THE PAST 12 MONTHS, YOU WORRIED THAT YOUR FOOD WOULD RUN OUT BEFORE YOU GOT THE MONEY TO BUY MORE: PATIENT DECLINED
HOW OFTEN DO YOU ATTEND CHURCH OR RELIGIOUS SERVICES?: PATIENT DECLINED
IN A TYPICAL WEEK, HOW MANY TIMES DO YOU TALK ON THE PHONE WITH FAMILY, FRIENDS, OR NEIGHBORS?: MORE THAN THREE TIMES A WEEK
HOW HARD IS IT FOR YOU TO PAY FOR THE VERY BASICS LIKE FOOD, HOUSING, MEDICAL CARE, AND HEATING?: PATIENT DECLINED
DO YOU BELONG TO ANY CLUBS OR ORGANIZATIONS SUCH AS CHURCH GROUPS UNIONS, FRATERNAL OR ATHLETIC GROUPS, OR SCHOOL GROUPS?: YES
IN A TYPICAL WEEK, HOW MANY TIMES DO YOU TALK ON THE PHONE WITH FAMILY, FRIENDS, OR NEIGHBORS?: MORE THAN THREE TIMES A WEEK

## 2025-03-04 ASSESSMENT — LIFESTYLE VARIABLES
AUDIT-C TOTAL SCORE: 0
HOW OFTEN DO YOU HAVE SIX OR MORE DRINKS ON ONE OCCASION: NEVER
HOW OFTEN DO YOU HAVE A DRINK CONTAINING ALCOHOL: NEVER
HOW MANY STANDARD DRINKS CONTAINING ALCOHOL DO YOU HAVE ON A TYPICAL DAY: PATIENT DOES NOT DRINK
SKIP TO QUESTIONS 9-10: 1

## 2025-03-07 ENCOUNTER — OFFICE VISIT (OUTPATIENT)
Dept: MEDICAL GROUP | Facility: PHYSICIAN GROUP | Age: 25
End: 2025-03-07
Payer: COMMERCIAL

## 2025-03-07 VITALS
HEART RATE: 87 BPM | OXYGEN SATURATION: 98 % | HEIGHT: 69 IN | SYSTOLIC BLOOD PRESSURE: 122 MMHG | WEIGHT: 286 LBS | TEMPERATURE: 97.9 F | BODY MASS INDEX: 42.36 KG/M2 | DIASTOLIC BLOOD PRESSURE: 78 MMHG

## 2025-03-07 DIAGNOSIS — Z00.00 WELLNESS EXAMINATION: ICD-10-CM

## 2025-03-07 DIAGNOSIS — Z00.00 PREVENTATIVE HEALTH CARE: ICD-10-CM

## 2025-03-07 PROCEDURE — 99395 PREV VISIT EST AGE 18-39: CPT | Performed by: STUDENT IN AN ORGANIZED HEALTH CARE EDUCATION/TRAINING PROGRAM

## 2025-03-07 PROCEDURE — 3078F DIAST BP <80 MM HG: CPT | Performed by: STUDENT IN AN ORGANIZED HEALTH CARE EDUCATION/TRAINING PROGRAM

## 2025-03-07 PROCEDURE — 3074F SYST BP LT 130 MM HG: CPT | Performed by: STUDENT IN AN ORGANIZED HEALTH CARE EDUCATION/TRAINING PROGRAM

## 2025-03-07 ASSESSMENT — PATIENT HEALTH QUESTIONNAIRE - PHQ9: CLINICAL INTERPRETATION OF PHQ2 SCORE: 0

## 2025-03-22 ENCOUNTER — HOSPITAL ENCOUNTER (OUTPATIENT)
Dept: LAB | Facility: MEDICAL CENTER | Age: 25
End: 2025-03-22
Attending: STUDENT IN AN ORGANIZED HEALTH CARE EDUCATION/TRAINING PROGRAM
Payer: COMMERCIAL

## 2025-03-22 DIAGNOSIS — Z00.00 PREVENTATIVE HEALTH CARE: ICD-10-CM

## 2025-03-22 LAB
ALBUMIN SERPL BCP-MCNC: 4.4 G/DL (ref 3.2–4.9)
ALBUMIN/GLOB SERPL: 1.2 G/DL
ALP SERPL-CCNC: 87 U/L (ref 30–99)
ALT SERPL-CCNC: 25 U/L (ref 2–50)
ANION GAP SERPL CALC-SCNC: 13 MMOL/L (ref 7–16)
AST SERPL-CCNC: 24 U/L (ref 12–45)
BILIRUB SERPL-MCNC: 0.5 MG/DL (ref 0.1–1.5)
BUN SERPL-MCNC: 11 MG/DL (ref 8–22)
CALCIUM ALBUM COR SERPL-MCNC: 9.5 MG/DL (ref 8.5–10.5)
CALCIUM SERPL-MCNC: 9.8 MG/DL (ref 8.5–10.5)
CHLORIDE SERPL-SCNC: 102 MMOL/L (ref 96–112)
CHOLEST SERPL-MCNC: 195 MG/DL (ref 100–199)
CO2 SERPL-SCNC: 23 MMOL/L (ref 20–33)
CREAT SERPL-MCNC: 0.79 MG/DL (ref 0.5–1.4)
ERYTHROCYTE [DISTWIDTH] IN BLOOD BY AUTOMATED COUNT: 41.1 FL (ref 35.9–50)
EST. AVERAGE GLUCOSE BLD GHB EST-MCNC: 103 MG/DL
GFR SERPLBLD CREATININE-BSD FMLA CKD-EPI: 105 ML/MIN/1.73 M 2
GLOBULIN SER CALC-MCNC: 3.7 G/DL (ref 1.9–3.5)
GLUCOSE SERPL-MCNC: 84 MG/DL (ref 65–99)
HBA1C MFR BLD: 5.2 % (ref 4–5.6)
HCT VFR BLD AUTO: 48.8 % (ref 37–47)
HDLC SERPL-MCNC: 47 MG/DL
HGB BLD-MCNC: 15.7 G/DL (ref 12–16)
LDLC SERPL CALC-MCNC: 129 MG/DL
MCH RBC QN AUTO: 27.8 PG (ref 27–33)
MCHC RBC AUTO-ENTMCNC: 32.2 G/DL (ref 32.2–35.5)
MCV RBC AUTO: 86.5 FL (ref 81.4–97.8)
PLATELET # BLD AUTO: 348 K/UL (ref 164–446)
PMV BLD AUTO: 9.5 FL (ref 9–12.9)
POTASSIUM SERPL-SCNC: 4.6 MMOL/L (ref 3.6–5.5)
PROT SERPL-MCNC: 8.1 G/DL (ref 6–8.2)
RBC # BLD AUTO: 5.64 M/UL (ref 4.2–5.4)
SODIUM SERPL-SCNC: 138 MMOL/L (ref 135–145)
TRIGL SERPL-MCNC: 95 MG/DL (ref 0–149)
TSH SERPL-ACNC: 1.81 UIU/ML (ref 0.35–5.5)
WBC # BLD AUTO: 9.4 K/UL (ref 4.8–10.8)

## 2025-03-22 PROCEDURE — 36415 COLL VENOUS BLD VENIPUNCTURE: CPT

## 2025-03-22 PROCEDURE — 80053 COMPREHEN METABOLIC PANEL: CPT

## 2025-03-22 PROCEDURE — 83036 HEMOGLOBIN GLYCOSYLATED A1C: CPT

## 2025-03-22 PROCEDURE — 80061 LIPID PANEL: CPT

## 2025-03-22 PROCEDURE — 85027 COMPLETE CBC AUTOMATED: CPT

## 2025-03-22 PROCEDURE — 84443 ASSAY THYROID STIM HORMONE: CPT

## 2025-03-25 ENCOUNTER — RESULTS FOLLOW-UP (OUTPATIENT)
Dept: MEDICAL GROUP | Facility: PHYSICIAN GROUP | Age: 25
End: 2025-03-25

## 2025-05-06 ENCOUNTER — OFFICE VISIT (OUTPATIENT)
Dept: MEDICAL GROUP | Facility: PHYSICIAN GROUP | Age: 25
End: 2025-05-06
Payer: COMMERCIAL

## 2025-05-06 VITALS
OXYGEN SATURATION: 98 % | WEIGHT: 279 LBS | SYSTOLIC BLOOD PRESSURE: 120 MMHG | DIASTOLIC BLOOD PRESSURE: 74 MMHG | HEART RATE: 85 BPM | HEIGHT: 69 IN | TEMPERATURE: 98.8 F | RESPIRATION RATE: 16 BRPM | BODY MASS INDEX: 41.32 KG/M2

## 2025-05-06 DIAGNOSIS — J30.2 SEASONAL ALLERGIES: ICD-10-CM

## 2025-05-06 DIAGNOSIS — R13.12 OROPHARYNGEAL DYSPHAGIA: ICD-10-CM

## 2025-05-06 PROBLEM — R06.83 SNORING: Status: RESOLVED | Noted: 2019-04-24 | Resolved: 2025-05-06

## 2025-05-06 PROBLEM — H61.21 IMPACTED CERUMEN, RIGHT EAR: Status: RESOLVED | Noted: 2023-07-07 | Resolved: 2025-05-06

## 2025-05-06 PROBLEM — N63.10 LUMP OF RIGHT BREAST: Status: RESOLVED | Noted: 2019-08-15 | Resolved: 2025-05-06

## 2025-05-06 PROCEDURE — 99213 OFFICE O/P EST LOW 20 MIN: CPT | Performed by: STUDENT IN AN ORGANIZED HEALTH CARE EDUCATION/TRAINING PROGRAM

## 2025-05-06 PROCEDURE — 3074F SYST BP LT 130 MM HG: CPT | Performed by: STUDENT IN AN ORGANIZED HEALTH CARE EDUCATION/TRAINING PROGRAM

## 2025-05-06 PROCEDURE — 3078F DIAST BP <80 MM HG: CPT | Performed by: STUDENT IN AN ORGANIZED HEALTH CARE EDUCATION/TRAINING PROGRAM

## 2025-05-06 RX ORDER — AZELASTINE 1 MG/ML
1 SPRAY, METERED NASAL 2 TIMES DAILY
Qty: 30 ML | Refills: 0 | Status: SHIPPED | OUTPATIENT
Start: 2025-05-06

## 2025-05-06 ASSESSMENT — FIBROSIS 4 INDEX: FIB4 SCORE: 0.36

## 2025-05-06 NOTE — PROGRESS NOTES
"Verbal consent was acquired by the patient to use [employer] ambient listening note generation during this visit.    Subjective:     HPI:   History of Present Illness  The patient presents with dysphagia.    Dysphagia  - Onset approximately 1 week ago.  - Experienced a sudden difficulty swallowing food during a recent trip to [place].  - Describes \"forgetting how to swallow\" briefly.  - Occurring intermittently since that time, with solid foods.  - No difficulty with fluids.  - No sore throat.  - No choking, globus sensation, food impaction, reflux, nausea, or vomiting.  - Notes that allergies have been a little worse than usual and she has had postnasal drainage.  - No history of esophageal issues, surgeries, or procedures.  - No head injury or neurologic changes.    Allergies  - Exacerbation of allergies during the trip, attributed to dense forest environment.  - Mild postnasal drainage from allergies.  - Tylenol Cold and Flu alleviated symptoms, but dysphagia occurred later.  - Previously used Flonase, discontinued due to nasal irritation.  - Occasionally uses  saline spray for dry nose.    Supplemental information: None      Past medical, surgical, family, and social history were reviewed and updated.     Medications:    Current Outpatient Medications:     azelastine (ASTELIN) 0.1 % nasal spray, Administer 1 Spray into affected nostril(S) 2 times a day., Disp: 30 mL, Rfl: 0    albuterol 108 (90 Base) MCG/ACT Aero Soln inhalation aerosol, Inhale 2 Puffs every 6 hours as needed for Shortness of Breath., Disp: 8.5 g, Rfl: 1    Allergies:  Patient has no known allergies.      Health Maintenance: Completed        Objective:     Exam:  /74   Pulse 85   Temp 37.1 °C (98.8 °F) (Temporal)   Resp 16   Ht 1.753 m (5' 9\")   Wt (!) 127 kg (279 lb)   SpO2 98%   BMI 41.20 kg/m²  Body mass index is 41.2 kg/m².    Physical Exam  Vitals reviewed.   Constitutional:       General: She is not in acute distress.  HENT: "      Mouth/Throat:      Mouth: Mucous membranes are moist.      Pharynx: Oropharynx is clear. No oropharyngeal exudate or posterior oropharyngeal erythema.   Eyes:      Extraocular Movements: Extraocular movements intact.   Cardiovascular:      Rate and Rhythm: Normal rate and regular rhythm.      Heart sounds: No murmur heard.     No friction rub. No gallop.   Pulmonary:      Effort: Pulmonary effort is normal.      Breath sounds: No wheezing, rhonchi or rales.   Musculoskeletal:      Cervical back: Neck supple.   Lymphadenopathy:      Head:      Right side of head: No submental or submandibular adenopathy.      Left side of head: No submental or submandibular adenopathy.      Cervical: No cervical adenopathy.   Skin:     General: Skin is warm and dry.   Neurological:      Mental Status: She is alert.      Cranial Nerves: No cranial nerve deficit.   Psychiatric:         Mood and Affect: Mood normal.         Results      Assessment & Plan:     1. Seasonal allergies  Referral to ENT    azelastine (ASTELIN) 0.1 % nasal spray      2. Oropharyngeal dysphagia  Referral to ENT          Assessment & Plan  1. Oropharyngeal Dysphagia.  - Acute problem Intermittent for the past 1 week  - Symptoms: difficulty initiating swallowing with solid foods  - Physical exam is unremarkable.  - Will try treating for allergies.  - Referral to ENT for further evaluation if symptoms worsen.  - Closely monitor symptoms.  - Follow up urgently if symptoms worsen, or any neurologic symptoms develop.    2. Allergic rhinitis.  - Increased allergy symptoms in the past few weeks, possibly contributing to dysphagia  - Avoids steroid nasal spray due to irritation  - Start once daily second generation antihistamine  - Start twice daily use of azelastine nasal spray      Follow-up: Scheduled for 06/10/2025.          Please note that this dictation was created using voice recognition software. I have made every reasonable attempt to correct obvious  errors, but I expect that there are errors of grammar and possibly content that I did not discover before finalizing the note.

## 2025-05-09 NOTE — Clinical Note
REFERRAL APPROVAL NOTICE         Sent on May 9, 2025                   Jade Patel Chip  [address] [place] [address]                   Dear Ms. Saunders,    After a careful review of the medical information and benefit coverage, [facility] has processed your referral. See below for additional details.    If applicable, you must be actively enrolled with your insurance for coverage of the authorized service. If you have any questions regarding your coverage, please contact your insurance directly.    REFERRAL INFORMATION   Referral #:  [id number]  Referred-To Provider    Referred-By Provider:  Otolaryngology    ELEONORA Santillan ANDREW      [address] [facility] [place] [id number] [phone] [address] [facility] [place] [address]  [phone]    Referral Start Date:  05/06/2025  Referral End Date:   05/06/2026             SCHEDULING  If you do not already have an appointment, please call [phone] to make an appointment.     MORE INFORMATION  If you do not already have a [employer] account, sign up at: [email or web address]  You can access your medical information, make appointments, see lab results, billing information, and more.  If you have questions regarding this referral, please contact  the [facility] Referrals department at:             [phone]. Monday - Friday 8:00AM - 5:00PM.     Sincerely,    [facility]